# Patient Record
Sex: MALE | Race: WHITE | NOT HISPANIC OR LATINO
[De-identification: names, ages, dates, MRNs, and addresses within clinical notes are randomized per-mention and may not be internally consistent; named-entity substitution may affect disease eponyms.]

---

## 2017-03-08 NOTE — ASU PATIENT PROFILE, ADULT - PMH
Depression    HLD (hyperlipidemia)    HTN (hypertension)    KRISTINA (obstructive sleep apnea)  no cpap  Seasonal affective disorder    Valvular heart disease

## 2017-03-09 ENCOUNTER — TRANSCRIPTION ENCOUNTER (OUTPATIENT)
Age: 67
End: 2017-03-09

## 2017-03-09 ENCOUNTER — OUTPATIENT (OUTPATIENT)
Dept: OUTPATIENT SERVICES | Facility: HOSPITAL | Age: 67
LOS: 1 days | End: 2017-03-09
Payer: COMMERCIAL

## 2017-03-09 VITALS
HEIGHT: 70 IN | DIASTOLIC BLOOD PRESSURE: 73 MMHG | HEART RATE: 61 BPM | RESPIRATION RATE: 17 BRPM | OXYGEN SATURATION: 95 % | TEMPERATURE: 98 F | WEIGHT: 216.49 LBS | SYSTOLIC BLOOD PRESSURE: 131 MMHG

## 2017-03-09 VITALS
DIASTOLIC BLOOD PRESSURE: 79 MMHG | OXYGEN SATURATION: 97 % | HEART RATE: 70 BPM | RESPIRATION RATE: 16 BRPM | SYSTOLIC BLOOD PRESSURE: 132 MMHG

## 2017-03-09 DIAGNOSIS — Z96.659 PRESENCE OF UNSPECIFIED ARTIFICIAL KNEE JOINT: Chronic | ICD-10-CM

## 2017-03-09 DIAGNOSIS — H35.371 PUCKERING OF MACULA, RIGHT EYE: ICD-10-CM

## 2017-03-09 DIAGNOSIS — Z98.890 OTHER SPECIFIED POSTPROCEDURAL STATES: Chronic | ICD-10-CM

## 2017-03-09 PROCEDURE — 67041 VIT FOR MACULAR PUCKER: CPT | Mod: RT

## 2017-03-09 NOTE — ASU DISCHARGE PLAN (ADULT/PEDIATRIC). - SPECIAL INSTRUCTIONS
POSITION HEAD FACE DOWN  FOLLOW UP TOMORROW IN OFFICE  CALL OFFICE -[phone] IF NEEDED FOR EMERGENCY

## 2017-03-09 NOTE — ASU DISCHARGE PLAN (ADULT/PEDIATRIC). - NOTIFY
Swelling that continues/Fever greater than 101/Persistent Nausea and Vomiting/Bleeding that does not stop/Pain not relieved by Medications

## 2017-08-15 ENCOUNTER — EMERGENCY (EMERGENCY)
Facility: HOSPITAL | Age: 67
LOS: 0 days | Discharge: HOME | End: 2017-08-15
Attending: EMERGENCY MEDICINE | Admitting: EMERGENCY MEDICINE
Payer: MEDICARE

## 2017-08-15 VITALS
TEMPERATURE: 99 F | OXYGEN SATURATION: 97 % | RESPIRATION RATE: 18 BRPM | SYSTOLIC BLOOD PRESSURE: 143 MMHG | WEIGHT: 210.1 LBS | HEART RATE: 75 BPM | HEIGHT: 69 IN | DIASTOLIC BLOOD PRESSURE: 80 MMHG

## 2017-08-15 DIAGNOSIS — S80.02XA CONTUSION OF LEFT KNEE, INITIAL ENCOUNTER: ICD-10-CM

## 2017-08-15 DIAGNOSIS — I38 ENDOCARDITIS, VALVE UNSPECIFIED: ICD-10-CM

## 2017-08-15 DIAGNOSIS — I10 ESSENTIAL (PRIMARY) HYPERTENSION: ICD-10-CM

## 2017-08-15 DIAGNOSIS — Z96.651 PRESENCE OF RIGHT ARTIFICIAL KNEE JOINT: ICD-10-CM

## 2017-08-15 DIAGNOSIS — S89.92XA UNSPECIFIED INJURY OF LEFT LOWER LEG, INITIAL ENCOUNTER: ICD-10-CM

## 2017-08-15 DIAGNOSIS — F32.9 MAJOR DEPRESSIVE DISORDER, SINGLE EPISODE, UNSPECIFIED: ICD-10-CM

## 2017-08-15 DIAGNOSIS — Z98.890 OTHER SPECIFIED POSTPROCEDURAL STATES: ICD-10-CM
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
## 2017-08-15 DIAGNOSIS — E78.5 HYPERLIPIDEMIA, UNSPECIFIED: ICD-10-CM

## 2017-08-15 DIAGNOSIS — Z98.890 OTHER SPECIFIED POSTPROCEDURAL STATES: Chronic | ICD-10-CM
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
## 2017-08-15 DIAGNOSIS — Z96.659 PRESENCE OF UNSPECIFIED ARTIFICIAL KNEE JOINT: Chronic | ICD-10-CM

## 2017-08-15 DIAGNOSIS — G47.33 OBSTRUCTIVE SLEEP APNEA (ADULT) (PEDIATRIC): ICD-10-CM

## 2017-08-15 DIAGNOSIS — M25.062 HEMARTHROSIS, LEFT KNEE: ICD-10-CM

## 2017-08-15 PROBLEM — F39 UNSPECIFIED MOOD [AFFECTIVE] DISORDER: Chronic | Status: ACTIVE | Noted: 2017-03-08

## 2017-08-15 PROCEDURE — 73562 X-RAY EXAM OF KNEE 3: CPT | Mod: 26,LT

## 2017-08-15 PROCEDURE — 20605 DRAIN/INJ JOINT/BURSA W/O US: CPT | Mod: LT

## 2017-08-15 PROCEDURE — 99284 EMERGENCY DEPT VISIT MOD MDM: CPT | Mod: 25

## 2017-08-15 NOTE — ED PROVIDER NOTE - OBJECTIVE STATEMENT
pt presents with left knee pain s.p mechanical fall one day prior with effusion pain 7/10 achy worse with movement a/w prior knee replacement no left hip or left ankle pain. no sensory deficits. no other acute issues symptoms or concerns.

## 2017-08-15 NOTE — ED ADULT TRIAGE NOTE - CHIEF COMPLAINT QUOTE
My left knee hurts s/p slip and fall yesterday around 1 p.  Denies hitting head, loc or blood thinners

## 2017-08-15 NOTE — ED PROVIDER NOTE - MUSCULOSKELETAL, MLM
+ ttp left ant knee with decreased rom ext nml felzxion + fluid wave shift no sensory deficits neurovasc intact 2+ dp and pt pulses nrom lle all digitsd

## 2017-08-15 NOTE — ED PROVIDER NOTE - MEDICAL DECISION MAKING DETAILS
rah 35 cc traumatic effusion bloody no purlent or seours fluid pt with great pain relief return to ed for new onset motor or sensory deficits crtuches knee immbolizer provided ortho f.u within one week without fail pt korin boyle he will follow up with already

## 2017-09-08 ENCOUNTER — APPOINTMENT (OUTPATIENT)
Dept: ORTHOPEDIC SURGERY | Facility: CLINIC | Age: 67
End: 2017-09-08

## 2017-10-03 NOTE — ED PROVIDER NOTE - ALLERGIC/IMMUNOLOGIC NEGATIVE STATEMENT, MLM
no dermatitis, no environmental allergies, no food allergies, no immunosuppressive disorder, and no pruritus.
(0) swallows foods and liquids w/o difficulty

## 2017-11-17 RX ORDER — TROPICAMIDE 1 %
1 DROPS OPHTHALMIC (EYE)
Qty: 0 | Refills: 0 | Status: COMPLETED | OUTPATIENT
Start: 2017-11-21 | End: 2017-11-21

## 2017-11-17 RX ORDER — OFLOXACIN 0.3 %
1 DROPS OPHTHALMIC (EYE)
Qty: 0 | Refills: 0 | Status: COMPLETED | OUTPATIENT
Start: 2017-11-21 | End: 2017-11-21

## 2017-11-17 RX ORDER — SODIUM CHLORIDE 9 MG/ML
1000 INJECTION, SOLUTION INTRAVENOUS
Qty: 0 | Refills: 0 | Status: DISCONTINUED | OUTPATIENT
Start: 2017-11-21 | End: 2017-12-06

## 2017-11-17 RX ORDER — PHENYLEPHRINE HCL 2.5 %
1 DROPS OPHTHALMIC (EYE)
Qty: 0 | Refills: 0 | Status: COMPLETED | OUTPATIENT
Start: 2017-11-21 | End: 2017-11-21

## 2017-11-21 ENCOUNTER — OUTPATIENT (OUTPATIENT)
Dept: OUTPATIENT SERVICES | Facility: HOSPITAL | Age: 67
LOS: 1 days | Discharge: HOME | End: 2017-11-21

## 2017-11-21 VITALS
SYSTOLIC BLOOD PRESSURE: 132 MMHG | OXYGEN SATURATION: 95 % | TEMPERATURE: 98 F | HEART RATE: 70 BPM | DIASTOLIC BLOOD PRESSURE: 80 MMHG | RESPIRATION RATE: 15 BRPM

## 2017-11-21 VITALS
WEIGHT: 223.11 LBS | TEMPERATURE: 98 F | DIASTOLIC BLOOD PRESSURE: 85 MMHG | HEART RATE: 71 BPM | SYSTOLIC BLOOD PRESSURE: 138 MMHG | RESPIRATION RATE: 14 BRPM | OXYGEN SATURATION: 95 %

## 2017-11-21 DIAGNOSIS — Z98.890 OTHER SPECIFIED POSTPROCEDURAL STATES: Chronic | ICD-10-CM

## 2017-11-21 DIAGNOSIS — Z96.659 PRESENCE OF UNSPECIFIED ARTIFICIAL KNEE JOINT: Chronic | ICD-10-CM

## 2017-11-21 DIAGNOSIS — Z87.828 PERSONAL HISTORY OF OTHER (HEALED) PHYSICAL INJURY AND TRAUMA: Chronic | ICD-10-CM

## 2017-11-21 RX ORDER — ACETAMINOPHEN 500 MG
650 TABLET ORAL EVERY 6 HOURS
Qty: 0 | Refills: 0 | Status: DISCONTINUED | OUTPATIENT
Start: 2017-11-21 | End: 2017-12-06

## 2017-11-21 RX ORDER — ACETAMINOPHEN 500 MG
2 TABLET ORAL
Qty: 0 | Refills: 0 | DISCHARGE
Start: 2017-11-21

## 2017-11-21 RX ADMIN — Medication 1 DROP(S): at 08:20

## 2017-11-21 RX ADMIN — Medication 1 DROP(S): at 08:32

## 2017-11-21 RX ADMIN — Medication 1 DROP(S): at 08:43

## 2017-11-21 RX ADMIN — Medication 1 DROP(S): at 08:17

## 2017-11-21 NOTE — ASU DISCHARGE PLAN (ADULT/PEDIATRIC). - MEDICATION SUMMARY - MEDICATIONS TO TAKE
I will START or STAY ON the medications listed below when I get home from the hospital:    cozar  -- once a day  -- Indication: For CATARACT RIGHT EYE    welbutrin  -- Indication: For CATARACT RIGHT EYE    aspirin 81 mg oral tablet  -- 1 tab(s) by mouth once a day  -- Indication: For CATARACT RIGHT EYE    acetaminophen 325 mg oral tablet  -- 2 tab(s) by mouth every 6 hours, As needed, Pain  -- Indication: For CATARACT RIGHT EYE    Lipitor  -- orally once a day  -- Indication: For CATARACT RIGHT EYE    Xanax 0.5 mg oral tablet  -- Indication: For CATARACT RIGHT EYE

## 2017-11-21 NOTE — BRIEF OPERATIVE NOTE - PROCEDURE
<<-----Click on this checkbox to enter Procedure Cataract extraction of eye with posterior chamber insertion of intraocular lens  11/21/2017    Active  ESMALL

## 2017-11-21 NOTE — ASU DISCHARGE PLAN (ADULT/PEDIATRIC). - NURSING INSTRUCTIONS
A responsible adult should be with you the first 24 hours after surgery.    Refer to multicolored written discharge instruction sheet.  Call doctor for:   pain that is getting worse or is not controlled with pain medicine.   if you have not urinated  within 8 hours after surgery or have difficulty urinating.   refer to Dr. Núñez's instructions

## 2017-11-21 NOTE — ASU PATIENT PROFILE, ADULT - PSH
History of back injury  sx lumbar 2013 & 2014 with hardware  History of shoulder surgery  right  S/P TKR (total knee replacement)

## 2017-12-05 DIAGNOSIS — H25.11 AGE-RELATED NUCLEAR CATARACT, RIGHT EYE: ICD-10-CM

## 2017-12-05 DIAGNOSIS — Z87.891 PERSONAL HISTORY OF NICOTINE DEPENDENCE: ICD-10-CM

## 2017-12-05 DIAGNOSIS — I10 ESSENTIAL (PRIMARY) HYPERTENSION: ICD-10-CM

## 2017-12-05 DIAGNOSIS — Z96.652 PRESENCE OF LEFT ARTIFICIAL KNEE JOINT: ICD-10-CM

## 2020-09-26 ENCOUNTER — EMERGENCY (EMERGENCY)
Facility: HOSPITAL | Age: 70
LOS: 0 days | Discharge: HOME | End: 2020-09-26
Payer: MEDICARE

## 2020-09-26 VITALS
HEART RATE: 88 BPM | DIASTOLIC BLOOD PRESSURE: 102 MMHG | HEIGHT: 78 IN | SYSTOLIC BLOOD PRESSURE: 151 MMHG | RESPIRATION RATE: 20 BRPM | OXYGEN SATURATION: 96 % | TEMPERATURE: 98 F

## 2020-09-26 DIAGNOSIS — I10 ESSENTIAL (PRIMARY) HYPERTENSION: ICD-10-CM

## 2020-09-26 DIAGNOSIS — Z20.828 CONTACT WITH AND (SUSPECTED) EXPOSURE TO OTHER VIRAL COMMUNICABLE DISEASES: ICD-10-CM

## 2020-09-26 DIAGNOSIS — Z96.659 PRESENCE OF UNSPECIFIED ARTIFICIAL KNEE JOINT: Chronic | ICD-10-CM

## 2020-09-26 DIAGNOSIS — Z87.828 PERSONAL HISTORY OF OTHER (HEALED) PHYSICAL INJURY AND TRAUMA: Chronic | ICD-10-CM

## 2020-09-26 DIAGNOSIS — Z98.890 OTHER SPECIFIED POSTPROCEDURAL STATES: Chronic | ICD-10-CM

## 2020-09-26 DIAGNOSIS — J06.9 ACUTE UPPER RESPIRATORY INFECTION, UNSPECIFIED: ICD-10-CM

## 2020-09-26 PROCEDURE — U0003: CPT

## 2020-09-26 PROCEDURE — 99282 EMERGENCY DEPT VISIT SF MDM: CPT

## 2020-09-26 PROCEDURE — 99283 EMERGENCY DEPT VISIT LOW MDM: CPT

## 2020-09-26 NOTE — ED STATDOCS - PHYSICAL EXAMINATION
Constitutional: NAD AAOx3  Eyes: EOMI, pupils equal  Head: Normocephalic atraumatic  Mouth: no airway obstruction  Cardiac: regular rate   Resp: Lungs CTAB  GI: Abd s/nt/nd  Neuro: CN2-12 intact  Skin: No rashes    Repeat BP is 139/81.

## 2020-09-26 NOTE — ED STATDOCS - PATIENT PORTAL LINK FT
You can access the FollowMyHealth Patient Portal offered by [facility] by registering at the following website: [email or web address]. By joining [facility] FollowMyHealth portal, you will also be able to view your health information using other applications (apps) compatible with our system.

## 2020-09-26 NOTE — ED STATDOCS - NS ED ROS FT
ROS: Constitutional- Neg fever, Neg chills.  Respiratory- (+) cough, Neg SOB  Cardiac- no chest pain, no palpitations, ENT- No rhinorrhea, (+) sore throat, no congestion.  Abdomen- No nausea, no vomiting, no diarrhea.  Urinary- no dysuria, no urgency, no frequency.  Skin- No rashes

## 2020-09-27 LAB — SARS-COV-2 RNA SPEC QL NAA+PROBE: SIGNIFICANT CHANGE UP

## 2020-10-14 ENCOUNTER — INPATIENT (INPATIENT)
Facility: HOSPITAL | Age: 70
LOS: 0 days | Discharge: HOME | DRG: 149 | End: 2020-10-15
Attending: FAMILY MEDICINE | Admitting: INTERNAL MEDICINE
Payer: MEDICARE

## 2020-10-14 VITALS — HEIGHT: 78 IN | WEIGHT: 235.01 LBS

## 2020-10-14 DIAGNOSIS — R42 DIZZINESS AND GIDDINESS: ICD-10-CM

## 2020-10-14 DIAGNOSIS — Z87.828 PERSONAL HISTORY OF OTHER (HEALED) PHYSICAL INJURY AND TRAUMA: Chronic | ICD-10-CM

## 2020-10-14 DIAGNOSIS — Z98.890 OTHER SPECIFIED POSTPROCEDURAL STATES: Chronic | ICD-10-CM

## 2020-10-14 DIAGNOSIS — Z96.659 PRESENCE OF UNSPECIFIED ARTIFICIAL KNEE JOINT: Chronic | ICD-10-CM

## 2020-10-14 LAB
ALBUMIN SERPL ELPH-MCNC: 3.7 G/DL — SIGNIFICANT CHANGE UP (ref 3.3–5)
ALP SERPL-CCNC: 76 U/L — SIGNIFICANT CHANGE UP (ref 40–120)
ALT FLD-CCNC: 38 U/L — SIGNIFICANT CHANGE UP (ref 12–78)
ANION GAP SERPL CALC-SCNC: 5 MMOL/L — SIGNIFICANT CHANGE UP (ref 5–17)
APTT BLD: 31.6 SEC — SIGNIFICANT CHANGE UP (ref 27.5–35.5)
AST SERPL-CCNC: 27 U/L — SIGNIFICANT CHANGE UP (ref 15–37)
BASOPHILS # BLD AUTO: 0.03 K/UL — SIGNIFICANT CHANGE UP (ref 0–0.2)
BASOPHILS NFR BLD AUTO: 0.4 % — SIGNIFICANT CHANGE UP (ref 0–2)
BILIRUB SERPL-MCNC: 0.2 MG/DL — SIGNIFICANT CHANGE UP (ref 0.2–1.2)
BUN SERPL-MCNC: 14 MG/DL — SIGNIFICANT CHANGE UP (ref 7–23)
CALCIUM SERPL-MCNC: 8.7 MG/DL — SIGNIFICANT CHANGE UP (ref 8.5–10.1)
CHLORIDE SERPL-SCNC: 107 MMOL/L — SIGNIFICANT CHANGE UP (ref 96–108)
CO2 SERPL-SCNC: 24 MMOL/L — SIGNIFICANT CHANGE UP (ref 22–31)
CREAT SERPL-MCNC: 0.93 MG/DL — SIGNIFICANT CHANGE UP (ref 0.5–1.3)
EOSINOPHIL # BLD AUTO: 0.09 K/UL — SIGNIFICANT CHANGE UP (ref 0–0.5)
EOSINOPHIL NFR BLD AUTO: 1.3 % — SIGNIFICANT CHANGE UP (ref 0–6)
GLUCOSE SERPL-MCNC: 97 MG/DL — SIGNIFICANT CHANGE UP (ref 70–99)
HCT VFR BLD CALC: 44.7 % — SIGNIFICANT CHANGE UP (ref 39–50)
HGB BLD-MCNC: 15 G/DL — SIGNIFICANT CHANGE UP (ref 13–17)
IMM GRANULOCYTES NFR BLD AUTO: 0.6 % — SIGNIFICANT CHANGE UP (ref 0–1.5)
INR BLD: 0.95 RATIO — SIGNIFICANT CHANGE UP (ref 0.88–1.16)
LYMPHOCYTES # BLD AUTO: 1.56 K/UL — SIGNIFICANT CHANGE UP (ref 1–3.3)
LYMPHOCYTES # BLD AUTO: 23.4 % — SIGNIFICANT CHANGE UP (ref 13–44)
MAGNESIUM SERPL-MCNC: 2.1 MG/DL — SIGNIFICANT CHANGE UP (ref 1.6–2.6)
MCHC RBC-ENTMCNC: 31.8 PG — SIGNIFICANT CHANGE UP (ref 27–34)
MCHC RBC-ENTMCNC: 33.6 GM/DL — SIGNIFICANT CHANGE UP (ref 32–36)
MCV RBC AUTO: 94.9 FL — SIGNIFICANT CHANGE UP (ref 80–100)
MONOCYTES # BLD AUTO: 0.8 K/UL — SIGNIFICANT CHANGE UP (ref 0–0.9)
MONOCYTES NFR BLD AUTO: 12 % — SIGNIFICANT CHANGE UP (ref 2–14)
NEUTROPHILS # BLD AUTO: 4.15 K/UL — SIGNIFICANT CHANGE UP (ref 1.8–7.4)
NEUTROPHILS NFR BLD AUTO: 62.3 % — SIGNIFICANT CHANGE UP (ref 43–77)
PLATELET # BLD AUTO: 216 K/UL — SIGNIFICANT CHANGE UP (ref 150–400)
POTASSIUM SERPL-MCNC: 4.2 MMOL/L — SIGNIFICANT CHANGE UP (ref 3.5–5.3)
POTASSIUM SERPL-SCNC: 4.2 MMOL/L — SIGNIFICANT CHANGE UP (ref 3.5–5.3)
PROT SERPL-MCNC: 7.2 GM/DL — SIGNIFICANT CHANGE UP (ref 6–8.3)
PROTHROM AB SERPL-ACNC: 11.1 SEC — SIGNIFICANT CHANGE UP (ref 10.6–13.6)
RBC # BLD: 4.71 M/UL — SIGNIFICANT CHANGE UP (ref 4.2–5.8)
RBC # FLD: 12.3 % — SIGNIFICANT CHANGE UP (ref 10.3–14.5)
SARS-COV-2 RNA SPEC QL NAA+PROBE: SIGNIFICANT CHANGE UP
SODIUM SERPL-SCNC: 136 MMOL/L — SIGNIFICANT CHANGE UP (ref 135–145)
TROPONIN I SERPL-MCNC: <0.015 NG/ML — SIGNIFICANT CHANGE UP (ref 0.01–0.04)
TROPONIN I SERPL-MCNC: <0.015 NG/ML — SIGNIFICANT CHANGE UP (ref 0.01–0.04)
WBC # BLD: 6.67 K/UL — SIGNIFICANT CHANGE UP (ref 3.8–10.5)
WBC # FLD AUTO: 6.67 K/UL — SIGNIFICANT CHANGE UP (ref 3.8–10.5)

## 2020-10-14 PROCEDURE — 99233 SBSQ HOSP IP/OBS HIGH 50: CPT

## 2020-10-14 PROCEDURE — 93010 ELECTROCARDIOGRAM REPORT: CPT

## 2020-10-14 PROCEDURE — 84484 ASSAY OF TROPONIN QUANT: CPT

## 2020-10-14 PROCEDURE — 72141 MRI NECK SPINE W/O DYE: CPT

## 2020-10-14 PROCEDURE — 36415 COLL VENOUS BLD VENIPUNCTURE: CPT

## 2020-10-14 PROCEDURE — 70551 MRI BRAIN STEM W/O DYE: CPT

## 2020-10-14 PROCEDURE — G0008: CPT

## 2020-10-14 PROCEDURE — 86803 HEPATITIS C AB TEST: CPT

## 2020-10-14 PROCEDURE — 86769 SARS-COV-2 COVID-19 ANTIBODY: CPT

## 2020-10-14 PROCEDURE — 70551 MRI BRAIN STEM W/O DYE: CPT | Mod: 26

## 2020-10-14 PROCEDURE — 90686 IIV4 VACC NO PRSV 0.5 ML IM: CPT

## 2020-10-14 PROCEDURE — 72141 MRI NECK SPINE W/O DYE: CPT | Mod: 26

## 2020-10-14 PROCEDURE — 70496 CT ANGIOGRAPHY HEAD: CPT | Mod: 26

## 2020-10-14 PROCEDURE — 85025 COMPLETE CBC W/AUTO DIFF WBC: CPT

## 2020-10-14 PROCEDURE — U0003: CPT

## 2020-10-14 PROCEDURE — 70498 CT ANGIOGRAPHY NECK: CPT | Mod: 26

## 2020-10-14 PROCEDURE — 99223 1ST HOSP IP/OBS HIGH 75: CPT | Mod: GC

## 2020-10-14 PROCEDURE — 80053 COMPREHEN METABOLIC PANEL: CPT

## 2020-10-14 PROCEDURE — 71045 X-RAY EXAM CHEST 1 VIEW: CPT | Mod: 26

## 2020-10-14 RX ORDER — ALPRAZOLAM 0.25 MG
0 TABLET ORAL
Qty: 0 | Refills: 0 | DISCHARGE

## 2020-10-14 RX ORDER — LOSARTAN POTASSIUM 100 MG/1
50 TABLET, FILM COATED ORAL DAILY
Refills: 0 | Status: DISCONTINUED | OUTPATIENT
Start: 2020-10-14 | End: 2020-10-15

## 2020-10-14 RX ORDER — ASPIRIN/CALCIUM CARB/MAGNESIUM 324 MG
325 TABLET ORAL ONCE
Refills: 0 | Status: COMPLETED | OUTPATIENT
Start: 2020-10-14 | End: 2020-10-14

## 2020-10-14 RX ORDER — BUPROPION HYDROCHLORIDE 150 MG/1
150 TABLET, EXTENDED RELEASE ORAL DAILY
Refills: 0 | Status: DISCONTINUED | OUTPATIENT
Start: 2020-10-14 | End: 2020-10-15

## 2020-10-14 RX ORDER — ALPRAZOLAM 0.25 MG
1 TABLET ORAL ONCE
Refills: 0 | Status: DISCONTINUED | OUTPATIENT
Start: 2020-10-14 | End: 2020-10-14

## 2020-10-14 RX ORDER — SODIUM CHLORIDE 9 MG/ML
1000 INJECTION INTRAMUSCULAR; INTRAVENOUS; SUBCUTANEOUS ONCE
Refills: 0 | Status: COMPLETED | OUTPATIENT
Start: 2020-10-14 | End: 2020-10-14

## 2020-10-14 RX ORDER — IBUPROFEN 200 MG
200 TABLET ORAL DAILY
Refills: 0 | Status: DISCONTINUED | OUTPATIENT
Start: 2020-10-14 | End: 2020-10-15

## 2020-10-14 RX ORDER — ONDANSETRON 8 MG/1
4 TABLET, FILM COATED ORAL EVERY 6 HOURS
Refills: 0 | Status: DISCONTINUED | OUTPATIENT
Start: 2020-10-14 | End: 2020-10-15

## 2020-10-14 RX ORDER — ATORVASTATIN CALCIUM 80 MG/1
0 TABLET, FILM COATED ORAL
Qty: 0 | Refills: 0 | DISCHARGE

## 2020-10-14 RX ORDER — HYDROCHLOROTHIAZIDE 25 MG
12.5 TABLET ORAL DAILY
Refills: 0 | Status: DISCONTINUED | OUTPATIENT
Start: 2020-10-14 | End: 2020-10-15

## 2020-10-14 RX ORDER — ATORVASTATIN CALCIUM 80 MG/1
20 TABLET, FILM COATED ORAL AT BEDTIME
Refills: 0 | Status: DISCONTINUED | OUTPATIENT
Start: 2020-10-14 | End: 2020-10-15

## 2020-10-14 RX ORDER — ATORVASTATIN CALCIUM 80 MG/1
1 TABLET, FILM COATED ORAL
Qty: 0 | Refills: 0 | DISCHARGE

## 2020-10-14 RX ORDER — HYDROMORPHONE HYDROCHLORIDE 2 MG/ML
4 INJECTION INTRAMUSCULAR; INTRAVENOUS; SUBCUTANEOUS DAILY
Refills: 0 | Status: DISCONTINUED | OUTPATIENT
Start: 2020-10-14 | End: 2020-10-15

## 2020-10-14 RX ORDER — ALPRAZOLAM 0.25 MG
0.25 TABLET ORAL DAILY
Refills: 0 | Status: DISCONTINUED | OUTPATIENT
Start: 2020-10-14 | End: 2020-10-15

## 2020-10-14 RX ADMIN — Medication 325 MILLIGRAM(S): at 19:27

## 2020-10-14 RX ADMIN — SODIUM CHLORIDE 1000 MILLILITER(S): 9 INJECTION INTRAMUSCULAR; INTRAVENOUS; SUBCUTANEOUS at 16:37

## 2020-10-14 RX ADMIN — Medication 1 MILLIGRAM(S): at 20:31

## 2020-10-14 NOTE — H&P ADULT - NSHPPHYSICALEXAM_GEN_ALL_CORE
Vital Signs Last 24 Hrs  T(C): 36.3 (14 Oct 2020 19:10), Max: 36.4 (14 Oct 2020 15:43)  T(F): 97.4 (14 Oct 2020 19:10), Max: 97.6 (14 Oct 2020 15:43)  HR: 72 (14 Oct 2020 19:10) (72 - 89)  BP: 123/79 (14 Oct 2020 19:10) (123/79 - 137/92)  BP(mean): 105 (14 Oct 2020 15:43) (105 - 105)  RR: 18 (14 Oct 2020 19:10) (18 - 18)  SpO2: 100% (14 Oct 2020 19:10) (96% - 100%)

## 2020-10-14 NOTE — H&P ADULT - ASSESSMENT
70M w/ hx of HTN, HLD, KRISTINA, and seasonal affective disorder who presents with dizziness 2/2 BPPV, stroke  Dizziness with ataxia   -positional likely bppv  -CTangio of head and neck:  1. Involutional changes in both hemispheres without an acute infarct or hemorrhage. No hydrocephalus or midline shift. No space-occupying lesion noted. Follow-up MR imaging of the brain may be considered for further assessment.  2. Calcified and noncalcified plaque both carotid bifurcations with at least moderate narrowing of both proximal internal carotid arteries. This may be further assessed with Doppler.  3. Both internal carotid arteries are markedly tortuous but widely patent. Also the vertebral arteries are bilaterally patent.  4. Tortuous but widely patent intracerebral vasculature also noted.  5. Extensive degenerative changes of the cervical spine with multilevel stenosis. Cord encroachment is suggested from C4 to C7.This may be further assessed with MR imaging as well.    -Plaques on imaging is not new and has been present for 2 years as per the patient  -admit to tele  -Neuro consult  -MRI head    HTN  -stable  DASH/TLC diet  -continue home medications of losartan 50mg qd hold for sbp<100  -continue hctz 12.5mg qd    HLD  -continue atorvastatin 20mg qhs  -restart rosuvastatin 5mg qhs upon discharge    Seasonal affective disorder  -continue home medication of welbutrin 150mg qd    Prophylactic measures  IMPROVE VTE Individual Risk Assessment      IMPROVE VTE Score ___1______    scds   70M w/ hx of HTN, HLD, KRISTINA, and seasonal affective disorder who presents with dizziness 2/2 BPPV, stroke  Dizziness with ataxia   -positional, bppv, cervical vertigo due to cord compression  -CTangio of head and neck:  1. Involutional changes in both hemispheres without an acute infarct or hemorrhage. No hydrocephalus or midline shift. No space-occupying lesion noted. Follow-up MR imaging of the brain may be considered for further assessment.  2. Calcified and noncalcified plaque both carotid bifurcations with at least moderate narrowing of both proximal internal carotid arteries. This may be further assessed with Doppler.  3. Both internal carotid arteries are markedly tortuous but widely patent. Also the vertebral arteries are bilaterally patent.  4. Tortuous but widely patent intracerebral vasculature also noted.  5. Extensive degenerative changes of the cervical spine with multilevel stenosis. Cord encroachment is suggested from C4 to C7.This may be further assessed with MR imaging as well.    -Plaques on imaging is not new and has been present for 2 years as per the patient  -admit to tele  -Neuro consult  -MRI head    HTN  -stable  DASH/TLC diet  -continue home medications of losartan 50mg qd hold for sbp<100  -continue hctz 12.5mg qd    HLD  -continue atorvastatin 20mg qhs  -restart rosuvastatin 5mg qhs upon discharge    Seasonal affective disorder  -continue home medication of welbutrin 150mg qd    Prophylactic measures  IMPROVE VTE Individual Risk Assessment      IMPROVE VTE Score ___1______    scds

## 2020-10-14 NOTE — H&P ADULT - ATTENDING COMMENTS
71 y/o M PMHx as noted above who presents to  for further evaluation and management of intractable vertigo with associate ataxic gait disturbance.    #Acute Onset of Intractable Vertigo with Ataxia   ~admit to Telemetry  ~DDx includes CVA, Cervicogenic Vertigo due to Cervical Cord Compression, Positional BPPV  ~Neurosurgery was consulted in the ED  ~initial CTA Head/Neck revealed signs of extensive cervical spine multilevel stenosis concerning for cord encroachment/compression  ~MR Head and C-spine were ordered appropriately   ~case discussed with Neurosurgery  ~f/u w/ Neurology in the am    #Hypertension  ~as above cont. home medications as ordered     #Hyperlipidemia  ~cont. statin therapy w/ Atorvastatin 20mg po qhs    #Seasonal affective disorder  ~cont. Wellbutrin XL 150mg po qd    #Vte ppx  ~IMPROVE Vte Risk Score is 1  ~cont. SCDs for now

## 2020-10-14 NOTE — ED STATDOCS - CLINICAL SUMMARY MEDICAL DECISION MAKING FREE TEXT BOX
71 y/o male with dizziness and unsteady gait. Plan: EKG, chest XR, labs, obtain outpatient CT head, reeval.

## 2020-10-14 NOTE — ED STATDOCS - PROGRESS NOTE DETAILS
I Beatrice Lockhart attest that this documentation has been prepared under the direction and in the presence of Doctor Parker. Patient initially seen and evaluated with ED attending at intake.  +dizziness with ataxic gait.  Was unable to obtain outpatient CT as it was not read by radiologist yes.  CTA here with chronic changes, no acute explanation.  As patient is persistently dizzy and ataxic, recommended admission for further evaluation and MRI.  Patient agreeable -Geoffrey Partida PA-C Elena LUCAS: endorsed to Dr. Mccollum for admission.

## 2020-10-14 NOTE — H&P ADULT - HISTORY OF PRESENT ILLNESS
70M w/ hx of HTN, HLD, KRISTINA, and seasonal affective disorder who presents with dizziness since Saturday. The pt bent down while playing golf and when he stood up he felt dizzy and blurry vision. States he felt off balance, not that the room was spinning. It lasted for a couple of minutes and resolved on its own. The patient did take dilaudid prior to playing golf. The same symptoms occurred again on Tuesday prior to admission while bending over as well as on day of admission. + associated nausea. Denies vomiting, numbness, tingling, slurring of speech, weakness, fever, chills, cough, SOB, CP, HA, or urinary symptoms.

## 2020-10-14 NOTE — H&P ADULT - RS GEN PE MLT RESP DETAILS PC
breath sounds equal/respirations non-labored/no rales/no rhonchi/no wheezes/clear to auscultation bilaterally/airway patent/good air movement

## 2020-10-14 NOTE — ED STATDOCS - ATTENDING CONTRIBUTION TO CARE
I, Amaris Parker DO,  performed the initial face to face bedside interview with this patient regarding history of present illness, review of symptoms and relevant past medical, social and family history.  I completed an independent physical examination.  I was the initial provider who evaluated this patient. I have signed out the follow up of any pending tests (i.e. labs, radiological studies) to the ACP.  I have communicated the patient’s plan of care and disposition with the ACP.  The history, relevant review of systems, past medical and surgical history, medical decision making, and physical examination was documented by the scribe in my presence and I attest to the accuracy of the documentation.

## 2020-10-14 NOTE — ED STATDOCS - OBJECTIVE STATEMENT
69 y/o male with a PMHx of depression, HTN, HLD, KRISTINA, seasonal affective disorder, valvular heart disease, presents to the ED c/o intermittent episodes of dizziness and blurry vision x5 days. Pt reports first episode began after he bent down to  a golf ball. Pt saw Dr. Sinha yesterday and was sent for imaging. Pt had CT this morning. Pt called Dr. Sinha today as symptoms persisted and was sent to ED for further evaluation. Pt also c/o unsteady gait. Denies weakness/numbness of arms and legs, changes in speech. No other complaints at this time.

## 2020-10-14 NOTE — H&P ADULT - NSICDXPASTSURGICALHX_GEN_ALL_CORE_FT
PAST SURGICAL HISTORY:  History of back injury sx lumbar 2013 & 2014 with hardware    History of shoulder surgery right    S/P TKR (total knee replacement) left

## 2020-10-14 NOTE — ED ADULT NURSE NOTE - OBJECTIVE STATEMENT
pt is 69 yo male presents to ED for dizziness and blurry vision after playing golf 1 week ago.  Denies falls, or trauma, not on any blood thinners

## 2020-10-14 NOTE — H&P ADULT - NEUROLOGICAL DETAILS
no spontaneous movement/responds to verbal commands/sensation intact/alert and oriented x 3/responds to pain/cranial nerves intact/normal strength

## 2020-10-14 NOTE — ED ADULT NURSE REASSESSMENT NOTE - NS ED NURSE REASSESS COMMENT FT1
Pt requesting xanax for his anxiety. Pt states 0.75mg or 1mg. AN Partida notified of pt request.
Pt resting comfortably in stretcher. Pt denies needs. Bed low wheels locked siderails up x2. Call bell in right hand.
Pt resting comfortably in stretcher. Pt denies needs. Call bell within reach of right hand. Bed low and locked.
Patient received at this time from RN Burt.  VS stable as charted.  Awaiting dinner tray and room assignment.

## 2020-10-14 NOTE — H&P ADULT - NSICDXPASTMEDICALHX_GEN_ALL_CORE_FT
PAST MEDICAL HISTORY:  Depression     HLD (hyperlipidemia)     HTN (hypertension)     KRISTINA (obstructive sleep apnea) no cpap    Seasonal affective disorder

## 2020-10-15 ENCOUNTER — TRANSCRIPTION ENCOUNTER (OUTPATIENT)
Age: 70
End: 2020-10-15

## 2020-10-15 VITALS
SYSTOLIC BLOOD PRESSURE: 141 MMHG | HEART RATE: 71 BPM | TEMPERATURE: 98 F | RESPIRATION RATE: 18 BRPM | OXYGEN SATURATION: 97 % | DIASTOLIC BLOOD PRESSURE: 80 MMHG

## 2020-10-15 LAB
ALBUMIN SERPL ELPH-MCNC: 3.4 G/DL — SIGNIFICANT CHANGE UP (ref 3.3–5)
ALP SERPL-CCNC: 66 U/L — SIGNIFICANT CHANGE UP (ref 40–120)
ALT FLD-CCNC: 34 U/L — SIGNIFICANT CHANGE UP (ref 12–78)
ANION GAP SERPL CALC-SCNC: 4 MMOL/L — LOW (ref 5–17)
AST SERPL-CCNC: 19 U/L — SIGNIFICANT CHANGE UP (ref 15–37)
BASOPHILS # BLD AUTO: 0.02 K/UL — SIGNIFICANT CHANGE UP (ref 0–0.2)
BASOPHILS NFR BLD AUTO: 0.4 % — SIGNIFICANT CHANGE UP (ref 0–2)
BILIRUB SERPL-MCNC: 0.3 MG/DL — SIGNIFICANT CHANGE UP (ref 0.2–1.2)
BUN SERPL-MCNC: 12 MG/DL — SIGNIFICANT CHANGE UP (ref 7–23)
CALCIUM SERPL-MCNC: 9.1 MG/DL — SIGNIFICANT CHANGE UP (ref 8.5–10.1)
CHLORIDE SERPL-SCNC: 110 MMOL/L — HIGH (ref 96–108)
CO2 SERPL-SCNC: 26 MMOL/L — SIGNIFICANT CHANGE UP (ref 22–31)
CREAT SERPL-MCNC: 0.76 MG/DL — SIGNIFICANT CHANGE UP (ref 0.5–1.3)
EOSINOPHIL # BLD AUTO: 0.11 K/UL — SIGNIFICANT CHANGE UP (ref 0–0.5)
EOSINOPHIL NFR BLD AUTO: 2.2 % — SIGNIFICANT CHANGE UP (ref 0–6)
GLUCOSE SERPL-MCNC: 92 MG/DL — SIGNIFICANT CHANGE UP (ref 70–99)
HCT VFR BLD CALC: 41.6 % — SIGNIFICANT CHANGE UP (ref 39–50)
HCV AB S/CO SERPL IA: 0.08 S/CO — SIGNIFICANT CHANGE UP (ref 0–0.99)
HCV AB SERPL-IMP: SIGNIFICANT CHANGE UP
HGB BLD-MCNC: 14 G/DL — SIGNIFICANT CHANGE UP (ref 13–17)
IMM GRANULOCYTES NFR BLD AUTO: 0.6 % — SIGNIFICANT CHANGE UP (ref 0–1.5)
LYMPHOCYTES # BLD AUTO: 1.2 K/UL — SIGNIFICANT CHANGE UP (ref 1–3.3)
LYMPHOCYTES # BLD AUTO: 24 % — SIGNIFICANT CHANGE UP (ref 13–44)
MCHC RBC-ENTMCNC: 32.3 PG — SIGNIFICANT CHANGE UP (ref 27–34)
MCHC RBC-ENTMCNC: 33.7 GM/DL — SIGNIFICANT CHANGE UP (ref 32–36)
MCV RBC AUTO: 96.1 FL — SIGNIFICANT CHANGE UP (ref 80–100)
MONOCYTES # BLD AUTO: 0.61 K/UL — SIGNIFICANT CHANGE UP (ref 0–0.9)
MONOCYTES NFR BLD AUTO: 12.2 % — SIGNIFICANT CHANGE UP (ref 2–14)
NEUTROPHILS # BLD AUTO: 3.03 K/UL — SIGNIFICANT CHANGE UP (ref 1.8–7.4)
NEUTROPHILS NFR BLD AUTO: 60.6 % — SIGNIFICANT CHANGE UP (ref 43–77)
PLATELET # BLD AUTO: 171 K/UL — SIGNIFICANT CHANGE UP (ref 150–400)
POTASSIUM SERPL-MCNC: 3.9 MMOL/L — SIGNIFICANT CHANGE UP (ref 3.5–5.3)
POTASSIUM SERPL-SCNC: 3.9 MMOL/L — SIGNIFICANT CHANGE UP (ref 3.5–5.3)
PROT SERPL-MCNC: 6.6 GM/DL — SIGNIFICANT CHANGE UP (ref 6–8.3)
RBC # BLD: 4.33 M/UL — SIGNIFICANT CHANGE UP (ref 4.2–5.8)
RBC # FLD: 12.6 % — SIGNIFICANT CHANGE UP (ref 10.3–14.5)
SODIUM SERPL-SCNC: 140 MMOL/L — SIGNIFICANT CHANGE UP (ref 135–145)
TROPONIN I SERPL-MCNC: <0.015 NG/ML — SIGNIFICANT CHANGE UP (ref 0.01–0.04)
WBC # BLD: 5 K/UL — SIGNIFICANT CHANGE UP (ref 3.8–10.5)
WBC # FLD AUTO: 5 K/UL — SIGNIFICANT CHANGE UP (ref 3.8–10.5)

## 2020-10-15 PROCEDURE — 99239 HOSP IP/OBS DSCHRG MGMT >30: CPT

## 2020-10-15 PROCEDURE — 99223 1ST HOSP IP/OBS HIGH 75: CPT

## 2020-10-15 RX ORDER — INFLUENZA VIRUS VACCINE 15; 15; 15; 15 UG/.5ML; UG/.5ML; UG/.5ML; UG/.5ML
0.5 SUSPENSION INTRAMUSCULAR ONCE
Refills: 0 | Status: COMPLETED | OUTPATIENT
Start: 2020-10-15 | End: 2020-10-15

## 2020-10-15 RX ORDER — ASPIRIN/CALCIUM CARB/MAGNESIUM 324 MG
81 TABLET ORAL DAILY
Refills: 0 | Status: DISCONTINUED | OUTPATIENT
Start: 2020-10-15 | End: 2020-10-15

## 2020-10-15 RX ORDER — MECLIZINE HCL 12.5 MG
25 TABLET ORAL
Qty: 30 | Refills: 0
Start: 2020-10-15 | End: 2020-11-13

## 2020-10-15 RX ADMIN — INFLUENZA VIRUS VACCINE 0.5 MILLILITER(S): 15; 15; 15; 15 SUSPENSION INTRAMUSCULAR at 15:38

## 2020-10-15 RX ADMIN — LOSARTAN POTASSIUM 50 MILLIGRAM(S): 100 TABLET, FILM COATED ORAL at 09:43

## 2020-10-15 RX ADMIN — Medication 12.5 MILLIGRAM(S): at 09:42

## 2020-10-15 RX ADMIN — Medication 81 MILLIGRAM(S): at 09:42

## 2020-10-15 RX ADMIN — BUPROPION HYDROCHLORIDE 150 MILLIGRAM(S): 150 TABLET, EXTENDED RELEASE ORAL at 09:42

## 2020-10-15 NOTE — CONSULT NOTE ADULT - SUBJECTIVE AND OBJECTIVE BOX
Patient is a 70y old  Male who presents with a chief complaint of Intractable Dizziness (15 Oct 2020 01:58)      HPI:  70M w/ hx of HTN, HLD, KRISTINA, and seasonal affective disorder who presents with dizziness since Saturday. The pt bent down while playing golf and when he stood up he felt dizzy and blurry vision. States he felt off balance, not that the room was spinning. It lasted for a couple of minutes and resolved on its own. The patient did take dilaudid prior to playing golf. The same symptoms occurred again on Tuesday prior to admission while bending over as well as on day of admission. + associated nausea. Denies vomiting, numbness, tingling, slurring of speech, weakness, fever, chills, cough, SOB, CP, HA, or urinary symptoms. (14 Oct 2020 21:33)    I spoke to his wife who reports that his gait has been "sloppy" for over one month.  She says that yesterday he could not walk a straight line even when he was not dizzy.   A few days ago he had fallen outside and was on his back.  She reports that he has had a few falls over the last year.    She also reports that he has severe KRISTINA and does not use a CPAP.  She feels that it is worse recently in the setting of weight gain.    PAST MEDICAL & SURGICAL HISTORY:  Seasonal affective disorder  Depression  KRISTINA (obstructive sleep apnea)  no cpap  HLD (hyperlipidemia)  HTN (hypertension)  History of back injury  sx lumbar 2013 &amp; 2014 with hardware  History of shoulder surgery  right  S/P TKR (total knee replacement)  left        FAMILY HISTORY:  No pertinent family history in first degree relatives        Social Hx:  Nonsmoker, no drug or alcohol use    MEDICATIONS  (STANDING):  aspirin  chewable 81 milliGRAM(s) Oral daily  atorvastatin 20 milliGRAM(s) Oral at bedtime  buPROPion XL . 150 milliGRAM(s) Oral daily  hydrochlorothiazide 12.5 milliGRAM(s) Oral daily  influenza   Vaccine 0.5 milliLiter(s) IntraMuscular once  losartan 50 milliGRAM(s) Oral daily       Allergies    No Known Allergies    Intolerances        ROS: Pertinent positives in HPI, all other ROS were reviewed and are negative.      Vital Signs Last 24 Hrs  T(C): 36.4 (15 Oct 2020 09:37), Max: 36.6 (14 Oct 2020 23:38)  T(F): 97.6 (15 Oct 2020 09:37), Max: 97.9 (14 Oct 2020 23:38)  HR: 71 (15 Oct 2020 09:37) (64 - 89)  BP: 141/80 (15 Oct 2020 09:37) (123/79 - 141/80)  BP(mean): 105 (14 Oct 2020 15:43) (105 - 105)  RR: 18 (15 Oct 2020 09:37) (18 - 18)  SpO2: 97% (15 Oct 2020 09:37) (96% - 100%)        Constitutional: awake and alert.  HEENT: PERRLA, EOMI,   Neck: Supple.  Respiratory: Breath sounds are clear bilaterally  Cardiovascular: S1 and S2, regular / irregular rhythm  Gastrointestinal: soft, nontender  Extremities:  no edema  Vascular: Caritid Bruit - no  Musculoskeletal: no joint swelling/tenderness, no abnormal movements  Skin: No rashes    Neurological exam:  HF: A x O x 3. Appropriately interactive, normal affect. Speech fluent, No Aphasia or paraphasic errors. Naming /repetition intact   CN: RASHEED, EOMI, VFF, facial sensation normal, no NLFD, tongue midline, Palate moves equally, SCM equal bilaterally  Motor: No pronator drift, Strength 5/5 in all 4 ext, normal bulk and tone, no tremor, rigidity or bradykinesia.    Sens: Intact to light touch / PP/ VS/ JS    Reflexes: Symmetric and normal . BJ 2+, BR 2+, KJ 2+, AJ 2+, downgoing toes b/l  Coord:  No FNFA, dysmetria, JAVIER intact   Gait/Balance: Normal/Cannot test    NIHSS:          Labs:   10-15    140  |  110<H>  |  12  ----------------------------<  92  3.9   |  26  |  0.76    Ca    9.1      15 Oct 2020 08:46  Mg     2.1     10-14    TPro  6.6  /  Alb  3.4  /  TBili  0.3  /  DBili  x   /  AST  19  /  ALT  34  /  AlkPhos  66  10-15                              14.0   5.00  )-----------( 171      ( 15 Oct 2020 08:46 )             41.6       Radiology:  - CT Head:  - MRI brain  -MRA brain/Carotids  - EEG    A/P:    No IV tpa given because…    -ASA/PLAVIX  -Atorvastatin  -DVT prophylaxis  -Dysphagia screen  -Speech and swallow eval  -PT eval/ rehab eval    Mangement d/w Pt / family /     Total Critical Care Time spent:   Patient is a 70y old  Male who presents with a chief complaint of Intractable Dizziness (15 Oct 2020 01:58)      HPI:  70M w/ hx of HTN, HLD, KRISTINA, and seasonal affective disorder who presents with dizziness since Saturday. The pt bent down while playing golf and when he stood up he felt dizzy and blurry vision. States he felt off balance, not that the room was spinning. It lasted for a couple of minutes and resolved on its own. The patient did take dilaudid prior to playing golf. The same symptoms occurred again on Tuesday prior to admission while bending over as well as on day of admission. + associated nausea. Denies vomiting, numbness, tingling, slurring of speech, weakness, fever, chills, cough, SOB, CP, HA, or urinary symptoms. (14 Oct 2020 21:33)    I spoke to his wife who reports that his gait has been "sloppy" for over one month.  She says that yesterday he could not walk a straight line even when he was not dizzy.   A few days ago he had fallen outside and was on his back.  She reports that he has had a few falls over the last year.    She also reports that he has severe KRISTINA and does not use a CPAP.  She feels that it is worse recently in the setting of weight gain.    He does report occasionally having numbness in left hand at night.    PAST MEDICAL & SURGICAL HISTORY:  Seasonal affective disorder  Depression  KRISTINA (obstructive sleep apnea)  no cpap  HLD (hyperlipidemia)  HTN (hypertension)  History of back injury  sx lumbar 2013 &amp; 2014 with hardware  History of shoulder surgery  right  S/P TKR (total knee replacement)  left        FAMILY HISTORY:  No pertinent family history in first degree relatives        Social Hx:  Nonsmoker, no drug or alcohol use    MEDICATIONS  (STANDING):  aspirin  chewable 81 milliGRAM(s) Oral daily  atorvastatin 20 milliGRAM(s) Oral at bedtime  buPROPion XL . 150 milliGRAM(s) Oral daily  hydrochlorothiazide 12.5 milliGRAM(s) Oral daily  influenza   Vaccine 0.5 milliLiter(s) IntraMuscular once  losartan 50 milliGRAM(s) Oral daily       Allergies    No Known Allergies    Intolerances        ROS: Pertinent positives in HPI, all other ROS were reviewed and are negative.      Vital Signs Last 24 Hrs  T(C): 36.4 (15 Oct 2020 09:37), Max: 36.6 (14 Oct 2020 23:38)  T(F): 97.6 (15 Oct 2020 09:37), Max: 97.9 (14 Oct 2020 23:38)  HR: 71 (15 Oct 2020 09:37) (64 - 89)  BP: 141/80 (15 Oct 2020 09:37) (123/79 - 141/80)  BP(mean): 105 (14 Oct 2020 15:43) (105 - 105)  RR: 18 (15 Oct 2020 09:37) (18 - 18)  SpO2: 97% (15 Oct 2020 09:37) (96% - 100%)        Constitutional: awake and alert.  HEENT: PERRLA, EOMI,   Neck: Supple.  Respiratory: Breath sounds are clear bilaterally  Cardiovascular: S1 and S2, regular / irregular rhythm  Gastrointestinal: soft, nontender  Extremities:  no edema  Vascular: Carotid Bruit - no  Musculoskeletal: no joint swelling/tenderness, no abnormal movements  Skin: No rashes    Neurological exam:  HF: A x O x 3. Appropriately interactive, normal affect. Speech fluent, No Aphasia or paraphasic errors. Naming /repetition intact   CN: RASHEED, EOMI, VFF, facial sensation normal, no NLFD, tongue midline, Palate moves equally, SCM equal bilaterally  Motor: No pronator drift, Strength 5/5 in all 4 ext, normal bulk and tone, no tremor, rigidity or bradykinesia.    Sens: Intact to light touch   Reflexes: Symmetric and hypoactive throughout - 1 in b/l biceps, radialis, trace in triceps, patellars, absent ankle jerks, negative Rutledge's  Coord:  No FNFA, dysmetria, JAVIER intact   Gait/Balance: slightly wide based, steady        Labs:   10-15    140  |  110<H>  |  12  ----------------------------<  92  3.9   |  26  |  0.76    Ca    9.1      15 Oct 2020 08:46  Mg     2.1     10-14    TPro  6.6  /  Alb  3.4  /  TBili  0.3  /  DBili  x   /  AST  19  /  ALT  34  /  AlkPhos  66  10-15                              14.0   5.00  )-----------( 171      ( 15 Oct 2020 08:46 )             41.6       Radiology:  CT head/CTA head/CTA neck 10/14/20:    1. Involutional changes in both hemispheres without an acute infarct or hemorrhage. No hydrocephalus or midline shift. No space-occupying lesion noted. Follow-up MR imaging of the brain may be considered for further assessment.  2. Calcified and noncalcified plaque both carotid bifurcations with at least moderate narrowing of both proximal internal carotid arteries. This may be further assessed with Doppler.  3. Both internal carotid arteries are markedly tortuous but widely patent. Also the vertebral arteries are bilaterally patent.  4. Tortuous but widely patent intracerebral vasculature also noted.  5. Extensive degenerative changes of the cervical spine with multilevel stenosis. Cord encroachment is suggested from C4 to C7.This may be further assessed with MR imaging as well.    MRI brain 10/14/20:  No acute intracranial abnormality.    MRI cervical spine 10/14/20:  1. Reversal of the normal cervical lordosis, possibly artifact related to  positioning versus muscular spasm.  2. Degenerative changes as described. Central canal stenosis with associated  cord compression and vague cord signal abnormality C3-C4 through C6-C7.

## 2020-10-15 NOTE — CONSULT NOTE ADULT - SUBJECTIVE AND OBJECTIVE BOX
Neurosurgery:    70M w/ hx of HTN, HLD, KRISTINA, and seasonal affective disorder who presents with dizziness since Saturday. The pt bent down while playing golf and when he stood up he felt dizzy and blurry vision. States he felt off balance, not that the room was spinning. It lasted for a couple of minutes and resolved on its own. The patient did take dilaudid prior to playing golf. The same symptoms occurred again on Tuesday prior to admission while bending over as well as on day of admission. + associated nausea. Denies vomiting, numbness, tingling, slurring of speech, weakness, fever, chills, cough, SOB, CP, HA, or urinary symptoms.    PAST MEDICAL & SURGICAL HISTORY:  Seasonal affective disorder  Depression  KRISTINA (obstructive sleep apnea) no cpap  HLD (hyperlipidemia)  HTN (hypertension)  History of back injury  sx lumbar 2013 &amp; 2014 with hardware  History of shoulder surgery right  S/P TKR (total knee replacement) left      FAMILY HISTORY:  No pertinent family history in first degree relatives    Social Hx:   Denies Tob,ETOH, Lives with wife at home    Allergies  NKDA    Home Medications:   * Patient Currently Takes Medications as of 14-Oct-2020 21:42 documented in Structured Notes  · 	ibuprofen 200 mg oral tablet: Last Dose Taken:  , 1 tab(s) orally once a day for back pain  · 	rosuvastatin 5 mg oral tablet: Last Dose Taken:  , 1 tab(s) orally once a day  	***PT HASN'T STARTED YET- WAS SUPPOSED TO START TODAY***  · 	hydroCHLOROthiazide 12.5 mg oral capsule: Last Dose Taken:  , 1 cap(s) orally once a day  · 	losartan 50 mg oral tablet: Last Dose Taken:  , 1 tab(s) orally once a day  · 	HYDROmorphone 4 mg oral tablet: Last Dose Taken:  , 1 tab(s) orally once a day, As Needed  · 	Wellbutrin  mg/24 hours oral tablet, extended release: Last Dose Taken:  , 1 tab(s) orally every 24 hours  · 	ALPRAZolam 0.25 mg oral tablet: Last Dose Taken:  , 1 tab(s) orally once a day, As Needed  · 	aspirin 81 mg oral tablet: Last Dose Taken:  , 1 tab(s) orally once a day      MEDICATIONS  (STANDING):  aspirin  chewable 81 milliGRAM(s) Oral daily  atorvastatin 20 milliGRAM(s) Oral at bedtime  buPROPion XL . 150 milliGRAM(s) Oral daily  hydrochlorothiazide 12.5 milliGRAM(s) Oral daily  influenza   Vaccine 0.5 milliLiter(s) IntraMuscular once  losartan 50 milliGRAM(s) Oral daily      Review of Symptoms  	Gen: no fevers, chills, sweats, weight loss, fatigue  	Visual: no recent changes in vision, no blurriness, no seeing spots  	Cardiovascular: no chest pain, no palpitations, no orthopnea, no leg swelling  	Respiratory: no shortness of breath, no exertional dyspnea, no cough, no rhinorrhea, no nasal congestion  	GI: no difficulty swallowing, + nausea, no vomiting, no abdominal pain, no diarrhea, no constipation, no melana  	: no dysuria, no increased freq, no hematuria, no malodorous urine  	Derm: no wounds, no rashes  	Heme: no easy bleeding or bruising  	MSK: no joint pain, no joint swelling or redness, no extremity pain                   Neuro: + dizziness    Vital Signs Last 24 Hrs  T(C): 36.3 (15 Oct 2020 01:15), Max: 36.6 (14 Oct 2020 23:38)  T(F): 97.4 (15 Oct 2020 01:15), Max: 97.9 (14 Oct 2020 23:38)  HR: 64 (15 Oct 2020 01:15) (64 - 89)  BP: 134/74 (15 Oct 2020 01:15) (123/79 - 137/92)  BP(mean): 105 (14 Oct 2020 15:43) (105 - 105)  RR: 18 (14 Oct 2020 23:38) (18 - 18)  SpO2: 97% (15 Oct 2020 01:15) (96% - 100%)    Labs:                        15.0   6.67  )-----------( 216      ( 14 Oct 2020 16:13 )             44.7     10-14    136  |  107  |  14  ----------------------------<  97  4.2   |  24  |  0.93    Ca    8.7      14 Oct 2020 16:13  Mg     2.1     10-14    TPro  7.2  /  Alb  3.7  /  TBili  0.2  /  DBili  x   /  AST  27  /  ALT  38  /  AlkPhos  76  10-14    PT/INR - ( 14 Oct 2020 16:13 )   PT: 11.1 sec;   INR: 0.95 ratio    PTT - ( 14 Oct 2020 16:13 )  PTT:31.6 sec    Radiology report:  - CT head: Negative  - MRI Brain: No CVA, No heme  - MRI Cspine: IMPRESSION:  1. Reversal of the normal cervical lordosis, possibly artifact related to  positioning versus muscular spasm.  2. Degenerative changes as described. Central canal stenosis with associated  cord compression and vague cord signal abnormality C3-C4 through C6-C7.      Physical Exam:  Constitutional: Awake / alert  HEENT: PERRLA, EOMI  Neck: Supple  Respiratory: Breath sounds are clear bilaterally  Cardiovascular: S1 and S2, regular rhythm  Gastrointestinal: Soft, NT/ND  Extremities:  no edema  Vascular: No carotid Bruit  Musculoskeletal: no joint swelling/tenderness, no abnormal movements  Skin: No rashes    Neurological Exam:  HF: A x O x 3, appropriately interactive, normal affect, speech fluent, no aphasia or paraphasic errors. Naming /repetition intact   CN: PERRL, EOMI, VFF, facial sensation normal, no NLFD, tongue midline  Motor: No pronator drift, Strength 5/5 in all 4 ext, normal bulk and tone, no tremor, rigidity or bradykinesia  Sens: Intact to light touch  Reflexes: Symmetric and normal, downgoing toes b/l  Coord:  No FNFA, dysmetria, JAVIER intact   Gait/Balance: Cannot test at this time    * Will return later to eval for myelpathic focused exam.... Hoffmans, Clonus, Spasticity in LE's.    A/P: 70M w/ hx of HTN, HLD, KRISTINA, and seasonal affective disorder who presents with dizziness since Saturday.  MRI brain neg.  MRI cpsine stenosis with some cord compression noted, possible scant cord signal changes.  Not c/w pt's symptoms of intractable dizziness with Nausea.    - Await MRI read official  - No steroids at this time for cervical spine  - Steroid may help with pt's nausea and vertiginous symptoms  - ENT for [place]-Halpike maneuver or other considerations  - Neurology eval may be considered  - d/w Dr. Muniz will eval further in later am hours and assess gait for spasticity.  - No acute neurosurgical intervention warranted at this time.

## 2020-10-15 NOTE — DISCHARGE NOTE PROVIDER - HOSPITAL COURSE
HPI: 70M w/ hx of HTN, HLD, KRISTINA, and seasonal affective disorder who presents with dizziness since Saturday. The pt bent down while playing golf and when he stood up he felt dizzy and blurry vision. States he felt off balance, not that the room was spinning. It lasted for a couple of minutes and resolved on its own. The patient did take dilaudid prior to playing golf. The same symptoms occurred again on Tuesday prior to admission while bending over as well as on day of admission. + associated nausea. Denies vomiting, numbness, tingling, slurring of speech, weakness, fever, chills, cough, SOB, CP, HA, or urinary symptoms. Pt's wife states pt had gait difficulty preceding dizziness. In ED initial CTA head/neck reveals signs of extensive cervical spine multilevel stenosis concerning for cord encroachement/compression. Neurosurgery evaluated pt who stated no acute surgical intervention. MR head and spine ordered. MR head was negative for acute infarct. MR spine showed cervical spine stenosis with some cord compression Neuro saw pt this AM. State pt likely has peripheral vertigo. Neuro rec hold off on vestibular therapy until seen by spine surgery. Neuro rec pt avoid rapid movements of neck and use meclizine as needed. Orthostatics done which were negative.     Subjective: Pt seen at bedside. AOx3. Denies dizziness, N/V, headache, chest pain, SOB. Hemodynamically stable.     PHYSICAL EXAM:  Vital Signs Last 24 Hrs  T(C): 36.4 (15 Oct 2020 09:37), Max: 36.6 (14 Oct 2020 23:38)  T(F): 97.6 (15 Oct 2020 09:37), Max: 97.9 (14 Oct 2020 23:38)  HR: 71 (15 Oct 2020 09:37) (64 - 89)  BP: 141/80 (15 Oct 2020 09:37) (123/79 - 141/80)  BP(mean): 105 (14 Oct 2020 15:43) (105 - 105)  RR: 18 (15 Oct 2020 09:37) (18 - 18)  SpO2: 97% (15 Oct 2020 09:37) (96% - 100%)    Constitutional: Pt lying in bed, awake and alert, NAD  HEENT: EOMI, normal hearing, moist mucous membranes  Neck: Soft and supple, no JVD  Respiratory: CTABL, No wheezing, rales or rhonchi  Cardiovascular: S1S2+, RRR, no M/G/R  Gastrointestinal: BS+, soft, NT/ND, no guarding, no rebound  Extremities: No peripheral edema  Vascular: 2+ peripheral pulses  Neurological: AAOx3, no focal deficits  Musculoskeletal: 5/5 strength b/l upper and lower extremities  Skin: No rashes    MEDICATIONS:  MEDICATIONS  (STANDING):  aspirin  chewable 81 milliGRAM(s) Oral daily  atorvastatin 20 milliGRAM(s) Oral at bedtime  buPROPion XL . 150 milliGRAM(s) Oral daily  hydrochlorothiazide 12.5 milliGRAM(s) Oral daily  influenza   Vaccine 0.5 milliLiter(s) IntraMuscular once  losartan 50 milliGRAM(s) Oral daily      LABS: All Labs Reviewed:                        14.0   5.00  )-----------( 171      ( 15 Oct 2020 08:46 )             41.6     10-15    140  |  110<H>  |  12  ----------------------------<  92  3.9   |  26  |  0.76    Ca    9.1      15 Oct 2020 08:46  Mg     2.1     10-14    TPro  6.6  /  Alb  3.4  /  TBili  0.3  /  DBili  x   /  AST  19  /  ALT  34  /  AlkPhos  66  10-15    PT/INR - ( 14 Oct 2020 16:13 )   PT: 11.1 sec;   INR: 0.95 ratio        RADIOLOGY/EKG:  < from: CT Angio Head w/ IV Cont (10.14.20 @ 18:15) >      IMPRESSION    1. Involutional changes in both hemispheres without an acute infarct or hemorrhage. No hydrocephalus or midline shift. No space-occupying lesion noted. Follow-up MR imaging of the brain may be considered for further assessment.  2. Calcified and noncalcified plaque both carotid bifurcations with at least moderate narrowing of both proximal internal carotid arteries. This may be further assessed with Doppler.  3. Both internal carotid arteries are markedly tortuous but widely patent. Also the vertebral arteries are bilaterally patent.  4. Tortuous but widely patent intracerebral vasculature also noted.  5. Extensive degenerative changes of the cervical spine with multilevel stenosis. Cord encroachment is suggested from C4 to C7.This may be further assessed with MR imaging as well.    < end of copied text >    < from: MR Head No Cont (10.14.20 @ 23:30) >  < from: MR Cervical Spine No Cont (10.14.20 @ 23:30) >      IMPRESSION:    There is reversal of the usual cervical lordosis without fracture and grade 1 anterolisthesis of C2 with respect to C3.    At C4-C5 large central osteophyte with ligamentum flavum infolding contribute to severe spinal canal narrowing and mild cord compression. There is no significant T2/STIR signal abnormality to suggest myelomalacia or cord edema.    At C5-C6, there is severe central canal narrowing and cord impingement.    There are cervical spondylitic changes contributing to moderate to severe neural foraminal narrowing as described above level by level.

## 2020-10-15 NOTE — CHART NOTE - NSCHARTNOTEFT_GEN_A_CORE
Patient seen and examined with Dr. Muniz.  Exam stable, neurologically intact. no hoffmans no clonus, normal reflexes.  SHIELDS 5/5.  Patient endorses symptoms of dizziness resolved.  MRI reviewed and no neurosurgical intervention warranted.  Made patient aware of MR findings of cervical stenosis and educated on sign and symptoms to be aware of moving forward and activities to avoid.  Patient stated they will follow-up with their spine doctor who previously operated on him.      Will sign-off, reconsult if any acute neuro changes occur.

## 2020-10-15 NOTE — DISCHARGE NOTE PROVIDER - NSDCCPCAREPLAN_GEN_ALL_CORE_FT
PRINCIPAL DISCHARGE DIAGNOSIS  Diagnosis: Dizziness  Assessment and Plan of Treatment: Dizziness  -Your MRI brain negative for acute infarct. Your MRI did show some cervical stenosis with some cord compression  -Neurosurgery evaluated you and rec no acute surgical intervention  -Neurology evaluated and belive symptoms likely due to peripheral vertigo. Rec holding off on vestibular therapy until you see your spine surgeon  -Try to avoid rapid movements of neck   -We will prescibe you meclizine which you can use once a day as needed for dizziness  -Please followup with your spine surgeon      SECONDARY DISCHARGE DIAGNOSES  Diagnosis: Seasonal affective disorder  Assessment and Plan of Treatment: -continue wellbutrin    Diagnosis: Hyperlipidemia  Assessment and Plan of Treatment: -continue statin    Diagnosis: Hypertension  Assessment and Plan of Treatment: -continue HCTZ  -continue losartan

## 2020-10-15 NOTE — DISCHARGE NOTE NURSING/CASE MANAGEMENT/SOCIAL WORK - PATIENT PORTAL LINK FT
You can access the FollowMyHealth Patient Portal offered by [facility] by registering at the following website: [email or web address]. By joining [employer]’s FollowMyHealth portal, you will also be able to view your health information using other applications (apps) compatible with our system.

## 2020-10-15 NOTE — DISCHARGE NOTE PROVIDER - CARE PROVIDER_API CALL
Wilber Sinha)  Cardiovascular Disease; Internal Medicine  [address], [address]  [place] [address]  Phone: [phone]  Fax: [phone]  Follow Up Time:

## 2020-10-15 NOTE — CONSULT NOTE ADULT - ASSESSMENT
70 year old man presenting with dizziness for several days.  His wife also reports that he has had gait difficulty preceding the dizziness.    Dizziness  -MRI brain negative for acute infarct  -Likely peripheral vertigo  -Would hold off on vestibular therapy until he is seen by his spine surgery  - I would avoid rapid movements of neck at this time.  -Can use meclizine prn    Gait instability  -Likely multifactorial - he has evidence of neuropathy on exam and cord compression on MRI c-spine  -No evidence of myelopathy on physical exam but this may be due to superimposed neuropathy  -Patient prefers to f/u with his spine surgeon  -Discussed risk of falls with wife.    Discussed with Dr. Carbajal

## 2020-10-15 NOTE — DISCHARGE NOTE PROVIDER - NSDCMRMEDTOKEN_GEN_ALL_CORE_FT
ALPRAZolam 0.25 mg oral tablet: 1 tab(s) orally once a day, As Needed  aspirin 81 mg oral tablet: 1 tab(s) orally once a day  hydroCHLOROthiazide 12.5 mg oral capsule: 1 cap(s) orally once a day  HYDROmorphone 4 mg oral tablet: 1 tab(s) orally once a day, As Needed  ibuprofen 200 mg oral tablet: 1 tab(s) orally once a day for back pain  losartan 50 mg oral tablet: 1 tab(s) orally once a day  meclizine 25 mg oral tablet: 25 milligram(s) orally once a day, As Needed  -for dizziness   rosuvastatin 5 mg oral tablet: 1 tab(s) orally once a day  ***PT HASN&#x27;T STARTED YET- WAS SUPPOSED TO START TODAY***  Wellbutrin  mg/24 hours oral tablet, extended release: 1 tab(s) orally every 24 hours

## 2020-10-16 LAB
SARS-COV-2 IGG SERPL QL IA: NEGATIVE — SIGNIFICANT CHANGE UP
SARS-COV-2 IGM SERPL IA-ACNC: <3.8 AU/ML — SIGNIFICANT CHANGE UP

## 2020-10-19 ENCOUNTER — APPOINTMENT (OUTPATIENT)
Dept: ORTHOPEDIC SURGERY | Facility: CLINIC | Age: 70
End: 2020-10-19
Payer: MEDICARE

## 2020-10-19 VITALS
HEIGHT: 69 IN | TEMPERATURE: 97.2 F | DIASTOLIC BLOOD PRESSURE: 82 MMHG | SYSTOLIC BLOOD PRESSURE: 126 MMHG | BODY MASS INDEX: 42.21 KG/M2 | WEIGHT: 285 LBS | HEART RATE: 116 BPM

## 2020-10-19 DIAGNOSIS — Z82.61 FAMILY HISTORY OF ARTHRITIS: ICD-10-CM

## 2020-10-19 DIAGNOSIS — Z86.39 PERSONAL HISTORY OF OTHER ENDOCRINE, NUTRITIONAL AND METABOLIC DISEASE: ICD-10-CM

## 2020-10-19 DIAGNOSIS — Z87.39 PERSONAL HISTORY OF OTHER DISEASES OF THE MUSCULOSKELETAL SYSTEM AND CONNECTIVE TISSUE: ICD-10-CM

## 2020-10-19 DIAGNOSIS — Z86.79 PERSONAL HISTORY OF OTHER DISEASES OF THE CIRCULATORY SYSTEM: ICD-10-CM

## 2020-10-19 PROCEDURE — 99204 OFFICE O/P NEW MOD 45 MIN: CPT

## 2020-10-19 PROCEDURE — 72040 X-RAY EXAM NECK SPINE 2-3 VW: CPT

## 2020-10-19 RX ORDER — ESCITALOPRAM OXALATE 10 MG/1
10 TABLET ORAL
Qty: 90 | Refills: 0 | Status: ACTIVE | COMMUNITY
Start: 2020-06-14

## 2020-10-19 RX ORDER — ROSUVASTATIN CALCIUM 5 MG/1
TABLET, FILM COATED ORAL
Refills: 0 | Status: ACTIVE | COMMUNITY

## 2020-10-19 RX ORDER — ATORVASTATIN CALCIUM 20 MG/1
20 TABLET, FILM COATED ORAL
Qty: 90 | Refills: 0 | Status: ACTIVE | COMMUNITY
Start: 2020-09-27

## 2020-10-19 RX ORDER — HYDROCHLOROTHIAZIDE 12.5 MG/1
12.5 CAPSULE ORAL
Qty: 90 | Refills: 0 | Status: ACTIVE | COMMUNITY
Start: 2020-09-17

## 2020-10-19 RX ORDER — BUPROPION HYDROCHLORIDE 150 MG/1
150 TABLET, EXTENDED RELEASE ORAL
Qty: 90 | Refills: 0 | Status: ACTIVE | COMMUNITY
Start: 2020-08-14

## 2020-10-19 RX ORDER — LOSARTAN POTASSIUM 50 MG/1
50 TABLET, FILM COATED ORAL
Qty: 90 | Refills: 0 | Status: ACTIVE | COMMUNITY
Start: 2020-09-17

## 2020-10-19 RX ORDER — ROSUVASTATIN CALCIUM 5 MG/1
5 TABLET, FILM COATED ORAL
Qty: 30 | Refills: 0 | Status: ACTIVE | COMMUNITY
Start: 2020-10-12

## 2020-10-20 ENCOUNTER — TRANSCRIPTION ENCOUNTER (OUTPATIENT)
Age: 70
End: 2020-10-20

## 2020-10-20 DIAGNOSIS — G47.33 OBSTRUCTIVE SLEEP APNEA (ADULT) (PEDIATRIC): ICD-10-CM
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
## 2020-10-20 DIAGNOSIS — G95.29 OTHER CORD COMPRESSION: ICD-10-CM

## 2020-10-20 DIAGNOSIS — Z87.891 PERSONAL HISTORY OF NICOTINE DEPENDENCE: ICD-10-CM

## 2020-10-20 DIAGNOSIS — E78.5 HYPERLIPIDEMIA, UNSPECIFIED: ICD-10-CM

## 2020-10-20 DIAGNOSIS — R27.0 ATAXIA, UNSPECIFIED: ICD-10-CM

## 2020-10-20 DIAGNOSIS — F33.9 MAJOR DEPRESSIVE DISORDER, RECURRENT, UNSPECIFIED: ICD-10-CM

## 2020-10-20 DIAGNOSIS — M48.02 SPINAL STENOSIS, CERVICAL REGION: ICD-10-CM

## 2020-10-20 DIAGNOSIS — I10 ESSENTIAL (PRIMARY) HYPERTENSION: ICD-10-CM

## 2020-10-20 DIAGNOSIS — Z96.652 PRESENCE OF LEFT ARTIFICIAL KNEE JOINT: ICD-10-CM

## 2020-10-20 DIAGNOSIS — Z79.82 LONG TERM (CURRENT) USE OF ASPIRIN: ICD-10-CM
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
## 2020-10-20 DIAGNOSIS — H81.10 BENIGN PAROXYSMAL VERTIGO, UNSPECIFIED EAR: ICD-10-CM

## 2020-10-22 DIAGNOSIS — Z23 ENCOUNTER FOR IMMUNIZATION: ICD-10-CM

## 2020-11-18 ENCOUNTER — APPOINTMENT (OUTPATIENT)
Dept: ORTHOPEDIC SURGERY | Facility: CLINIC | Age: 70
End: 2020-11-18
Payer: MEDICARE

## 2020-11-18 VITALS — BODY MASS INDEX: 12.59 KG/M2 | WEIGHT: 85 LBS | HEIGHT: 69 IN | TEMPERATURE: 97 F

## 2020-11-18 PROCEDURE — 99214 OFFICE O/P EST MOD 30 MIN: CPT

## 2020-11-23 ENCOUNTER — APPOINTMENT (OUTPATIENT)
Dept: SPINE | Facility: CLINIC | Age: 70
End: 2020-11-23

## 2021-03-16 ENCOUNTER — OUTPATIENT (OUTPATIENT)
Dept: OUTPATIENT SERVICES | Facility: HOSPITAL | Age: 71
LOS: 1 days | End: 2021-03-16
Payer: MEDICARE

## 2021-03-16 DIAGNOSIS — Z87.828 PERSONAL HISTORY OF OTHER (HEALED) PHYSICAL INJURY AND TRAUMA: Chronic | ICD-10-CM

## 2021-03-16 DIAGNOSIS — Z96.659 PRESENCE OF UNSPECIFIED ARTIFICIAL KNEE JOINT: Chronic | ICD-10-CM

## 2021-03-16 DIAGNOSIS — Z98.890 OTHER SPECIFIED POSTPROCEDURAL STATES: Chronic | ICD-10-CM

## 2021-03-16 DIAGNOSIS — Z20.828 CONTACT WITH AND (SUSPECTED) EXPOSURE TO OTHER VIRAL COMMUNICABLE DISEASES: ICD-10-CM

## 2021-03-16 LAB — SARS-COV-2 RNA SPEC QL NAA+PROBE: SIGNIFICANT CHANGE UP

## 2021-03-16 PROCEDURE — U0003: CPT

## 2021-03-16 PROCEDURE — U0005: CPT

## 2021-03-18 ENCOUNTER — OUTPATIENT (OUTPATIENT)
Dept: OUTPATIENT SERVICES | Facility: HOSPITAL | Age: 71
LOS: 1 days | End: 2021-03-18
Payer: MEDICARE

## 2021-03-18 DIAGNOSIS — Z87.828 PERSONAL HISTORY OF OTHER (HEALED) PHYSICAL INJURY AND TRAUMA: Chronic | ICD-10-CM

## 2021-03-18 DIAGNOSIS — M54.16 RADICULOPATHY, LUMBAR REGION: ICD-10-CM

## 2021-03-18 DIAGNOSIS — Z98.890 OTHER SPECIFIED POSTPROCEDURAL STATES: Chronic | ICD-10-CM

## 2021-03-18 DIAGNOSIS — Z96.659 PRESENCE OF UNSPECIFIED ARTIFICIAL KNEE JOINT: Chronic | ICD-10-CM

## 2021-03-18 PROCEDURE — 64483 NJX AA&/STRD TFRM EPI L/S 1: CPT | Mod: RT

## 2021-03-27 ENCOUNTER — TRANSCRIPTION ENCOUNTER (OUTPATIENT)
Age: 71
End: 2021-03-27

## 2021-05-04 ENCOUNTER — OUTPATIENT (OUTPATIENT)
Dept: OUTPATIENT SERVICES | Facility: HOSPITAL | Age: 71
LOS: 1 days | End: 2021-05-04
Payer: MEDICARE

## 2021-05-04 DIAGNOSIS — Z98.890 OTHER SPECIFIED POSTPROCEDURAL STATES: Chronic | ICD-10-CM

## 2021-05-04 DIAGNOSIS — Z87.828 PERSONAL HISTORY OF OTHER (HEALED) PHYSICAL INJURY AND TRAUMA: Chronic | ICD-10-CM

## 2021-05-04 DIAGNOSIS — Z20.828 CONTACT WITH AND (SUSPECTED) EXPOSURE TO OTHER VIRAL COMMUNICABLE DISEASES: ICD-10-CM

## 2021-05-04 DIAGNOSIS — Z96.659 PRESENCE OF UNSPECIFIED ARTIFICIAL KNEE JOINT: Chronic | ICD-10-CM

## 2021-05-04 LAB — SARS-COV-2 RNA SPEC QL NAA+PROBE: SIGNIFICANT CHANGE UP

## 2021-05-04 PROCEDURE — U0003: CPT

## 2021-05-04 PROCEDURE — U0005: CPT

## 2021-05-06 ENCOUNTER — OUTPATIENT (OUTPATIENT)
Dept: OUTPATIENT SERVICES | Facility: HOSPITAL | Age: 71
LOS: 1 days | End: 2021-05-06
Payer: MEDICARE

## 2021-05-06 DIAGNOSIS — M54.16 RADICULOPATHY, LUMBAR REGION: ICD-10-CM

## 2021-05-06 DIAGNOSIS — Z87.828 PERSONAL HISTORY OF OTHER (HEALED) PHYSICAL INJURY AND TRAUMA: Chronic | ICD-10-CM

## 2021-05-06 DIAGNOSIS — Z96.659 PRESENCE OF UNSPECIFIED ARTIFICIAL KNEE JOINT: Chronic | ICD-10-CM

## 2021-05-06 DIAGNOSIS — Z98.890 OTHER SPECIFIED POSTPROCEDURAL STATES: Chronic | ICD-10-CM

## 2021-05-06 PROCEDURE — 62323 NJX INTERLAMINAR LMBR/SAC: CPT

## 2021-05-24 ENCOUNTER — OUTPATIENT (OUTPATIENT)
Dept: OUTPATIENT SERVICES | Facility: HOSPITAL | Age: 71
LOS: 1 days | End: 2021-05-24
Payer: MEDICARE

## 2021-05-24 DIAGNOSIS — Z87.828 PERSONAL HISTORY OF OTHER (HEALED) PHYSICAL INJURY AND TRAUMA: Chronic | ICD-10-CM

## 2021-05-24 DIAGNOSIS — M54.16 RADICULOPATHY, LUMBAR REGION: ICD-10-CM

## 2021-05-24 DIAGNOSIS — Z96.659 PRESENCE OF UNSPECIFIED ARTIFICIAL KNEE JOINT: Chronic | ICD-10-CM

## 2021-05-24 DIAGNOSIS — Z98.890 OTHER SPECIFIED POSTPROCEDURAL STATES: Chronic | ICD-10-CM

## 2021-05-24 PROCEDURE — 62323 NJX INTERLAMINAR LMBR/SAC: CPT

## 2021-09-17 ENCOUNTER — OUTPATIENT (OUTPATIENT)
Dept: OUTPATIENT SERVICES | Facility: HOSPITAL | Age: 71
LOS: 1 days | End: 2021-09-17
Payer: MEDICARE

## 2021-09-17 DIAGNOSIS — Z98.890 OTHER SPECIFIED POSTPROCEDURAL STATES: Chronic | ICD-10-CM

## 2021-09-17 DIAGNOSIS — Z96.659 PRESENCE OF UNSPECIFIED ARTIFICIAL KNEE JOINT: Chronic | ICD-10-CM

## 2021-09-17 DIAGNOSIS — Z20.828 CONTACT WITH AND (SUSPECTED) EXPOSURE TO OTHER VIRAL COMMUNICABLE DISEASES: ICD-10-CM

## 2021-09-17 DIAGNOSIS — Z87.828 PERSONAL HISTORY OF OTHER (HEALED) PHYSICAL INJURY AND TRAUMA: Chronic | ICD-10-CM

## 2021-09-17 LAB — SARS-COV-2 RNA SPEC QL NAA+PROBE: SIGNIFICANT CHANGE UP

## 2021-09-17 PROCEDURE — U0005: CPT

## 2021-09-17 PROCEDURE — U0003: CPT

## 2021-09-20 ENCOUNTER — OUTPATIENT (OUTPATIENT)
Dept: OUTPATIENT SERVICES | Facility: HOSPITAL | Age: 71
LOS: 1 days | End: 2021-09-20
Payer: MEDICARE

## 2021-09-20 DIAGNOSIS — Z96.659 PRESENCE OF UNSPECIFIED ARTIFICIAL KNEE JOINT: Chronic | ICD-10-CM

## 2021-09-20 DIAGNOSIS — Z87.828 PERSONAL HISTORY OF OTHER (HEALED) PHYSICAL INJURY AND TRAUMA: Chronic | ICD-10-CM

## 2021-09-20 DIAGNOSIS — Z98.890 OTHER SPECIFIED POSTPROCEDURAL STATES: Chronic | ICD-10-CM

## 2021-09-20 DIAGNOSIS — M54.16 RADICULOPATHY, LUMBAR REGION: ICD-10-CM

## 2021-09-20 PROCEDURE — 62323 NJX INTERLAMINAR LMBR/SAC: CPT

## 2021-11-12 NOTE — ED ADULT NURSE NOTE - BREATH SOUNDS, LEFT
-- DO NOT REPLY / DO NOT REPLY ALL --  -- Message is from the [facility]--    General Patient Message      Reason for Call: Patient mother says that she is returning a call from the office. Please call back at [phone].    Caller Information       Type Contact Phone    11/12/2021 03:58 PM CST Phone (Incoming) TESSA SANCHEZ (Mother) [phone]          Alternative phone number: n/a    Turnaround time given to caller:   \"This message will be sent to [state Provider's name]. The clinical team will fulfill your request as soon as they review your message.\"     clear

## 2021-11-16 ENCOUNTER — OUTPATIENT (OUTPATIENT)
Dept: OUTPATIENT SERVICES | Facility: HOSPITAL | Age: 71
LOS: 1 days | End: 2021-11-16
Payer: MEDICARE

## 2021-11-16 DIAGNOSIS — Z98.890 OTHER SPECIFIED POSTPROCEDURAL STATES: Chronic | ICD-10-CM

## 2021-11-16 DIAGNOSIS — Z20.828 CONTACT WITH AND (SUSPECTED) EXPOSURE TO OTHER VIRAL COMMUNICABLE DISEASES: ICD-10-CM

## 2021-11-16 DIAGNOSIS — Z87.828 PERSONAL HISTORY OF OTHER (HEALED) PHYSICAL INJURY AND TRAUMA: Chronic | ICD-10-CM

## 2021-11-16 DIAGNOSIS — Z96.659 PRESENCE OF UNSPECIFIED ARTIFICIAL KNEE JOINT: Chronic | ICD-10-CM

## 2021-11-16 LAB — SARS-COV-2 RNA SPEC QL NAA+PROBE: SIGNIFICANT CHANGE UP

## 2021-11-16 PROCEDURE — U0005: CPT

## 2021-11-16 PROCEDURE — U0003: CPT

## 2021-11-18 ENCOUNTER — OUTPATIENT (OUTPATIENT)
Dept: OUTPATIENT SERVICES | Facility: HOSPITAL | Age: 71
LOS: 1 days | End: 2021-11-18
Payer: MEDICARE

## 2021-11-18 DIAGNOSIS — Z87.828 PERSONAL HISTORY OF OTHER (HEALED) PHYSICAL INJURY AND TRAUMA: Chronic | ICD-10-CM

## 2021-11-18 DIAGNOSIS — Z98.890 OTHER SPECIFIED POSTPROCEDURAL STATES: Chronic | ICD-10-CM

## 2021-11-18 DIAGNOSIS — Z96.659 PRESENCE OF UNSPECIFIED ARTIFICIAL KNEE JOINT: Chronic | ICD-10-CM

## 2021-11-18 DIAGNOSIS — M54.16 RADICULOPATHY, LUMBAR REGION: ICD-10-CM

## 2021-11-18 PROCEDURE — 62323 NJX INTERLAMINAR LMBR/SAC: CPT

## 2022-06-29 NOTE — ED PROVIDER NOTE - ENMT, MLM
Airway patent, Nasal mucosa clear. Mouth with normal mucosa. Throat has no vesicles, no oropharyngeal exudates and uvula is midline. Instructions: This plan will send the code FBSD to the PM system.  DO NOT or CHANGE the price. Price (Do Not Change): 0.00 Detail Level: Simple

## 2023-01-01 NOTE — PATIENT PROFILE ADULT - NSPROMEDSADMININFO_GEN_A_NUR
1900 Received baby in an open crib attached to cardiac monitor not in respiratory distress on room air, NGT in placed.   no concerns

## 2023-03-20 ENCOUNTER — APPOINTMENT (OUTPATIENT)
Dept: PAIN MANAGEMENT | Facility: CLINIC | Age: 73
End: 2023-03-20
Payer: MEDICARE

## 2023-03-20 VITALS — BODY MASS INDEX: 32.58 KG/M2 | HEIGHT: 69 IN | WEIGHT: 220 LBS

## 2023-03-20 DIAGNOSIS — Z98.1 ARTHRODESIS STATUS: ICD-10-CM

## 2023-03-20 PROCEDURE — 99213 OFFICE O/P EST LOW 20 MIN: CPT | Mod: 95

## 2023-03-20 NOTE — DISCUSSION/SUMMARY
[Medication Risks Reviewed] : Medication risks reviewed [de-identified] : Prescriptions renewed. [place]obt[place]  reviewed and appropriate. SOAPP-R completed on chart. The patient's medications are documented to the best of their ability. Quality of life and functional ability improved on medications. The patient is showing no aberrant behavior or evidence of diversion. The patient was advised not to use narcotic medication while operating an automobile or heavy machinery due to potential sedation or dizziness. The patient was educated to the risks associated with potential opioid dependence and addiction. Urine toxicology screens as per office protocol. Use of multimodal analgesia used prn.\par Follow up prn.

## 2023-03-20 NOTE — HISTORY OF PRESENT ILLNESS
[Lower back] : lower back [Gradual] : gradual [2] : 2 [Dull/Aching] : dull/aching [Constant] : constant [Rest] : rest [Meds] : meds [Ice] : ice [Heat] : heat [Walking/activity] : walking/activity [Standing] : standing [Part time] : Work status: part time [3] : 3 [Steroid] : Steroid [FreeTextEntry1] : States his neckl and back pain well controlled. S/P both  Cervical and Lumbar fusions in the past. Uses Dilaudid prn for pain with relief. He continues working Part-time. [] : Post Surgical Visit: no [FreeTextEntry8] : NONE  [de-identified] : 03/2022 [de-identified] : HOME EXERCISE PROGRAM 6-7X WEEK  [de-identified] : LUMBAR [de-identified] : EPIDURAL

## 2023-03-20 NOTE — REASON FOR VISIT
[Follow-Up Visit] : a follow-up pain management visit [Home] : at home, [unfilled] , at the time of the visit. [Medical Office: ([facility])___] : at the medical office located in  [Patient] : the patient [Self] : self [FreeTextEntry2] : Neck and back pain

## 2023-05-22 ENCOUNTER — NON-APPOINTMENT (OUTPATIENT)
Age: 73
End: 2023-05-22

## 2023-05-25 ENCOUNTER — APPOINTMENT (OUTPATIENT)
Dept: OTOLARYNGOLOGY | Facility: CLINIC | Age: 73
End: 2023-05-25
Payer: MEDICARE

## 2023-05-25 VITALS — HEIGHT: 68 IN | TEMPERATURE: 97 F | WEIGHT: 225 LBS | BODY MASS INDEX: 34.1 KG/M2

## 2023-05-25 DIAGNOSIS — H90.A21 SENSORINEURAL HEARING LOSS, UNILATERAL, RIGHT EAR, WITH RESTRICTED HEARING ON THE CONTRALATERAL SIDE: ICD-10-CM

## 2023-05-25 DIAGNOSIS — H61.22 IMPACTED CERUMEN, LEFT EAR: ICD-10-CM

## 2023-05-25 DIAGNOSIS — H93.8X3 OTHER SPECIFIED DISORDERS OF EAR, BILATERAL: ICD-10-CM

## 2023-05-25 DIAGNOSIS — J34.3 HYPERTROPHY OF NASAL TURBINATES: ICD-10-CM

## 2023-05-25 DIAGNOSIS — H90.3 SENSORINEURAL HEARING LOSS, BILATERAL: ICD-10-CM

## 2023-05-25 DIAGNOSIS — H61.21 IMPACTED CERUMEN, RIGHT EAR: ICD-10-CM

## 2023-05-25 PROCEDURE — 31231 NASAL ENDOSCOPY DX: CPT

## 2023-05-25 PROCEDURE — 92557 COMPREHENSIVE HEARING TEST: CPT

## 2023-05-25 PROCEDURE — 92567 TYMPANOMETRY: CPT

## 2023-05-25 PROCEDURE — 99204 OFFICE O/P NEW MOD 45 MIN: CPT | Mod: 25

## 2023-05-25 PROCEDURE — G0268 REMOVAL OF IMPACTED WAX MD: CPT

## 2023-05-25 RX ORDER — ALPRAZOLAM 0.5 MG/1
0.5 TABLET ORAL
Refills: 0 | Status: ACTIVE | COMMUNITY

## 2023-05-25 NOTE — PHYSICAL EXAM
[Midline] : trachea located in midline position [Normal] : no rashes [de-identified] : left impacted cerumen ; xs cerumen right  [de-identified] : after cerumen removal  [de-identified] : mild inf turb hyperftrohy s

## 2023-05-25 NOTE — HISTORY OF PRESENT ILLNESS
[de-identified] : c/o clogged left ear - for past 10 days - improving now.  Occurred suddenlty. Worse on left but occ right. No prior ear problems .   Did have ? URI last week but now improved.

## 2023-05-25 NOTE — REASON FOR VISIT
[Initial Consultation] : an initial consultation for [FreeTextEntry2] : clogged ear/nasal congestion

## 2023-05-25 NOTE — DATA REVIEWED
[de-identified] : Asymmetric SNHL\par Mild to severe SNHL 5365-7427 Hz right ear\par Mild to moderate SNL 8955-2835 Hz left ear Type A right ear\par Type C left ear\par

## 2023-05-25 NOTE — ASSESSMENT
[FreeTextEntry1] : Patient with c/o occ nasal congestion and clogged ears - worse on left.  Has left impacted cerumen = exam normal after .  No evidence of sinus infection.  Has asymetric snhl worse in left ear.  Feel current congestion likley resolving URI or mild allergies - recommended conservative care - can use OTC flonase. Also would get MRi for hearng asymetry - if negative followup in 6 mos for repeat audio and possible cerumen .

## 2023-05-25 NOTE — REVIEW OF SYSTEMS
[Ear Noises] : ear noises [Nasal Congestion] : nasal congestion [Throat Clearing] : throat clearing [Negative] : Heme/Lymph [de-identified] : fullness more on left than right [de-identified] : slight pain when swallowing

## 2023-06-14 ENCOUNTER — APPOINTMENT (OUTPATIENT)
Dept: PAIN MANAGEMENT | Facility: CLINIC | Age: 73
End: 2023-06-14
Payer: MEDICARE

## 2023-06-14 VITALS — WEIGHT: 225 LBS | BODY MASS INDEX: 34.1 KG/M2 | HEIGHT: 68 IN

## 2023-06-14 PROCEDURE — 99213 OFFICE O/P EST LOW 20 MIN: CPT

## 2023-06-14 NOTE — HISTORY OF PRESENT ILLNESS
[Lower back] : lower back [Gradual] : gradual [2] : 2 [Dull/Aching] : dull/aching [Constant] : constant [Rest] : rest [Meds] : meds [Ice] : ice [Heat] : heat [Walking/activity] : walking/activity [Standing] : standing [Part time] : Work status: part time [3] : 3 [Steroid] : Steroid [] : Post Surgical Visit: no [FreeTextEntry8] : NONE  [de-identified] : 03/2022 [de-identified] : HOME EXERCISE PROGRAM 6-7X WEEK  [de-identified] : LUMBAR [de-identified] : EPIDURAL

## 2023-06-15 ENCOUNTER — NON-APPOINTMENT (OUTPATIENT)
Age: 73
End: 2023-06-15

## 2023-09-12 ENCOUNTER — APPOINTMENT (OUTPATIENT)
Dept: PAIN MANAGEMENT | Facility: CLINIC | Age: 73
End: 2023-09-12
Payer: MEDICARE

## 2023-09-12 VITALS — HEIGHT: 68 IN | WEIGHT: 230 LBS | BODY MASS INDEX: 34.86 KG/M2

## 2023-09-12 PROCEDURE — 99213 OFFICE O/P EST LOW 20 MIN: CPT

## 2023-10-23 ENCOUNTER — APPOINTMENT (OUTPATIENT)
Dept: OTOLARYNGOLOGY | Facility: CLINIC | Age: 73
End: 2023-10-23
Payer: MEDICARE

## 2023-10-23 VITALS — BODY MASS INDEX: 34.86 KG/M2 | TEMPERATURE: 97.6 F | HEIGHT: 68 IN | WEIGHT: 230 LBS

## 2023-10-23 DIAGNOSIS — R42 DIZZINESS AND GIDDINESS: ICD-10-CM

## 2023-10-23 DIAGNOSIS — H90.3 SENSORINEURAL HEARING LOSS, BILATERAL: ICD-10-CM

## 2023-10-23 DIAGNOSIS — H61.23 IMPACTED CERUMEN, BILATERAL: ICD-10-CM

## 2023-10-23 DIAGNOSIS — R26.89 OTHER ABNORMALITIES OF GAIT AND MOBILITY: ICD-10-CM

## 2023-10-23 PROCEDURE — G0268 REMOVAL OF IMPACTED WAX MD: CPT

## 2023-10-23 PROCEDURE — 92557 COMPREHENSIVE HEARING TEST: CPT

## 2023-10-23 PROCEDURE — 92567 TYMPANOMETRY: CPT

## 2023-10-23 PROCEDURE — 99214 OFFICE O/P EST MOD 30 MIN: CPT | Mod: 25

## 2023-11-17 ENCOUNTER — APPOINTMENT (OUTPATIENT)
Dept: OTOLARYNGOLOGY | Facility: CLINIC | Age: 73
End: 2023-11-17

## 2024-01-12 ENCOUNTER — APPOINTMENT (OUTPATIENT)
Dept: PAIN MANAGEMENT | Facility: CLINIC | Age: 74
End: 2024-01-12
Payer: MEDICARE

## 2024-01-12 PROCEDURE — 99213 OFFICE O/P EST LOW 20 MIN: CPT | Mod: 95

## 2024-01-12 NOTE — HISTORY OF PRESENT ILLNESS
[Lower back] : lower back [Gradual] : gradual [1] : 2 [Dull/Aching] : dull/aching [Constant] : constant [Rest] : rest [Meds] : meds [Ice] : ice [Heat] : heat [Walking/activity] : walking/activity [Standing] : standing [Part time] : Work status: part time [3] : 3 [Steroid] : Steroid [FreeTextEntry1] : Chronic low back pain stable. Meds effective prn. No new issues, He belongs to a local [employer] and walks the indoor track for exercise.  [] : no [FreeTextEntry8] : NONE  [de-identified] : 03/2022 [de-identified] : PERFORMING HOME EXERCISES / STRETCHES DAILY AS TOLERATED AS OF 2024-01-12

## 2024-01-12 NOTE — DISCUSSION/SUMMARY
[Medication Risks Reviewed] : Medication risks reviewed [de-identified] : Prescriptions renewed. [place] and [place]. SOAPP-R completed on chart. The patient's medications are documented to the best of their ability. Quality of life and functional ability improved on medications. The patient is showing no aberrant behavior or evidence of diversion. The patient was advised not to use narcotic medication while operating an automobile or heavy machinery due to potential sedation or dizziness. The patient was educated to the risks associated with potential opioid dependence and addiction. Urine toxicology screens as per office protocol. Use of multimodal analgesia used prn. Follow up one month.

## 2024-01-12 NOTE — REASON FOR VISIT
[Follow-Up Visit] : a follow-up pain management visit [Home] : at home, [unfilled] , at the time of the visit. [Medical Office: ([facility])___] : at the medical office located in  [Patient] : the patient [Self] : self [FreeTextEntry2] : MED REFILL

## 2024-02-20 ENCOUNTER — APPOINTMENT (OUTPATIENT)
Dept: PAIN MANAGEMENT | Facility: CLINIC | Age: 74
End: 2024-02-20
Payer: MEDICARE

## 2024-02-20 PROCEDURE — 99213 OFFICE O/P EST LOW 20 MIN: CPT

## 2024-02-20 RX ORDER — NALOXONE HYDROCHLORIDE 4 MG/.1ML
4 SPRAY NASAL
Qty: 1 | Refills: 0 | Status: ACTIVE | COMMUNITY
Start: 2023-09-12 | End: 1900-01-01

## 2024-02-20 NOTE — HISTORY OF PRESENT ILLNESS
[Lower back] : lower back [Gradual] : gradual [1] : 2 [Sharp] : sharp [Constant] : constant [Rest] : rest [Meds] : meds [Ice] : ice [Heat] : heat [Walking/activity] : walking/activity [Standing] : standing [Part time] : Work status: part time [3] : 3 [Steroid] : Steroid [FreeTextEntry1] : States back pain stable. Remains active and golfs as tolerated. Meds effective prn.  [] : no [FreeTextEntry8] : NONE  [de-identified] : 03/2022 [de-identified] : PERFORMING HOME EXERCISES / STRETCHES DAILY AS TOLERATED AS OF 2024-02-20

## 2024-02-20 NOTE — DISCUSSION/SUMMARY
[Medication Risks Reviewed] : Medication risks reviewed [de-identified] : Prescriptions renewed. [place] and [place]. SOAPP-R completed on chart. The patient's medications are documented to the best of their ability. Quality of life and functional ability improved on medications. The patient is showing no aberrant behavior or evidence of diversion. The patient was advised not to use narcotic medication while operating an automobile or heavy machinery due to potential sedation or dizziness. The patient was educated to the risks associated with potential opioid dependence and addiction. Urine toxicology screens as per office protocol. Use of multimodal analgesia used prn. Follow up one month.

## 2024-03-07 ENCOUNTER — APPOINTMENT (OUTPATIENT)
Dept: ORTHOPEDIC SURGERY | Facility: CLINIC | Age: 74
End: 2024-03-07
Payer: MEDICARE

## 2024-03-07 VITALS — WEIGHT: 225 LBS | BODY MASS INDEX: 34.1 KG/M2 | HEIGHT: 68 IN

## 2024-03-07 DIAGNOSIS — M48.02 SPINAL STENOSIS, CERVICAL REGION: ICD-10-CM

## 2024-03-07 DIAGNOSIS — M40.202 UNSPECIFIED KYPHOSIS, CERVICAL REGION: ICD-10-CM

## 2024-03-07 PROCEDURE — 99203 OFFICE O/P NEW LOW 30 MIN: CPT

## 2024-03-07 NOTE — HISTORY OF PRESENT ILLNESS
[Localized] : localized [Meds] : meds [Lying in bed] : lying in bed [] : no [FreeTextEntry5] : 72 y/o M presents for NP eval of the Lt. shldr today. Pt report of gradual shldr pain for the past 8 weeks with no associated trauma. Numbness down into Lt. hand. Hydromorphone and cortisone as needed. Good ROM.

## 2024-03-07 NOTE — ASSESSMENT
[FreeTextEntry1] : The patient is a 74 yo man presenting with left shoulder pain of 2 months without trauma. The patient has pain to the superior and lateral shoulder. He denies paresthesias but has cervical spine fusion. The patient has felt weak with ADLs and has had decreased ROM. He is LHD. The patient has been taking a medrol dose pack and his pain has since decreased significantly. The patient has intermittent numbness and tingling to the left hand and ultimately most of his pain is the trapezius. The patient is able to complete ADLs and lifting to shoulder height. He has not had any treatment.  Left shoulder exam; The patient presents with no apparent distress. Neurovascularly intact. Sensation intact to left upper extremity. No scars, rashes, or abrasions. 2+ pulses to the distal radius.Tender to palpation at the superior shoulder and trapezius. Slight tenderness to anterior shoulder as well. AROM   ABD 90 ER 65 IR L1. 5-/5 RC strength. 5/5 Deltoid strength. +Sami. - Sarah.   Left shoulder xrays 3 views taken on 1/16/24  - No GHOA. No AC OA. GH joint is reduced. No fractures or loose bodies. No obvious tumors, masses, or lesions.   The patient has very good left shoulder ROM and strength. Based on his extensive c-spine history, relief with oral steroid, and location of pain I believe his neck is the likely issue. He will go for MRI to r/o and structural issues. He will also begin PT.

## 2024-03-13 ENCOUNTER — OUTPATIENT (OUTPATIENT)
Dept: OUTPATIENT SERVICES | Facility: HOSPITAL | Age: 74
LOS: 1 days | End: 2024-03-13
Payer: MEDICARE

## 2024-03-13 ENCOUNTER — RESULT REVIEW (OUTPATIENT)
Age: 74
End: 2024-03-13

## 2024-03-13 ENCOUNTER — APPOINTMENT (OUTPATIENT)
Dept: MRI IMAGING | Facility: CLINIC | Age: 74
End: 2024-03-13
Payer: MEDICARE

## 2024-03-13 DIAGNOSIS — M48.02 SPINAL STENOSIS, CERVICAL REGION: ICD-10-CM

## 2024-03-13 DIAGNOSIS — Z87.828 PERSONAL HISTORY OF OTHER (HEALED) PHYSICAL INJURY AND TRAUMA: Chronic | ICD-10-CM

## 2024-03-13 DIAGNOSIS — Z96.659 PRESENCE OF UNSPECIFIED ARTIFICIAL KNEE JOINT: Chronic | ICD-10-CM

## 2024-03-13 DIAGNOSIS — Z98.890 OTHER SPECIFIED POSTPROCEDURAL STATES: Chronic | ICD-10-CM

## 2024-03-13 DIAGNOSIS — M40.202 UNSPECIFIED KYPHOSIS, CERVICAL REGION: ICD-10-CM

## 2024-03-13 PROCEDURE — 73221 MRI JOINT UPR EXTREM W/O DYE: CPT | Mod: 26,LT,MH

## 2024-03-13 PROCEDURE — 73221 MRI JOINT UPR EXTREM W/O DYE: CPT | Mod: MH

## 2024-03-18 ENCOUNTER — APPOINTMENT (OUTPATIENT)
Dept: ORTHOPEDIC SURGERY | Facility: CLINIC | Age: 74
End: 2024-03-18
Payer: MEDICARE

## 2024-03-18 ENCOUNTER — APPOINTMENT (OUTPATIENT)
Dept: PAIN MANAGEMENT | Facility: CLINIC | Age: 74
End: 2024-03-18
Payer: MEDICARE

## 2024-03-18 VITALS — BODY MASS INDEX: 34.1 KG/M2 | HEIGHT: 68 IN | WEIGHT: 225 LBS

## 2024-03-18 DIAGNOSIS — M25.812 OTHER SPECIFIED JOINT DISORDERS, LEFT SHOULDER: ICD-10-CM

## 2024-03-18 PROCEDURE — 99213 OFFICE O/P EST LOW 20 MIN: CPT

## 2024-03-18 NOTE — DISCUSSION/SUMMARY
[Medication Risks Reviewed] : Medication risks reviewed [de-identified] : Prescriptions renewed. [place] and [place]. SOAPP-R completed on chart. The patient's medications are documented to the best of their ability. Quality of life and functional ability improved on medications. The patient is showing no aberrant behavior or evidence of diversion. The patient was advised not to use narcotic medication while operating an automobile or heavy machinery due to potential sedation or dizziness. The patient was educated to the risks associated with potential opioid dependence and addiction. Urine toxicology screens as per office protocol. Use of multimodal analgesia used prn. Follow up one month.

## 2024-03-18 NOTE — REASON FOR VISIT
[Follow-Up Visit] : a follow-up pain management visit [Home] : at home, [unfilled] , at the time of the visit. [Medical Office: ([facility])___] : at the medical office located in  [Patient] : the patient [Self] : self

## 2024-03-18 NOTE — HISTORY OF PRESENT ILLNESS
[2] : 2 [Lower back] : lower back [Burning] : burning [Frequent] : frequent [Meds] : meds [Heat] : heat [Retired] : Work status: retired [FreeTextEntry1] : Chronic back pain. Meds effective prn. Following up with Dr Medina later today. regarding shoulder and possible. neck. issues. S/P Cervical fusion 3. years ago with Dr Hill at [facility].  [] : no [de-identified] : EXTENDED ACTIVITY [de-identified] : 2022 - FUSION T11-S1 [de-identified] : MRI'S [TWNoteComboBox1] : 70%

## 2024-03-18 NOTE — HISTORY OF PRESENT ILLNESS
[2] : 2 [Localized] : localized [Meds] : meds [Lying in bed] : lying in bed [] : Post Surgical Visit: no [FreeTextEntry5] : 72 y/o M presents for FU eval of the Lt. shldr today. Pt report of gradual shldr pain for the past 8 weeks with no associated trauma. Numbness down into Lt. hand. Hydromorphone and cortisone as needed. Good ROM.  He is going over his mri results today. States he is progessing slowly with minimal pain.

## 2024-03-18 NOTE — ASSESSMENT
[FreeTextEntry1] : Comes in today for evaluation of his left shoulder.  He has had pain in the left shoulder which mostly radiates from his neck when he pushes his neck back.  He had previous cervical fusion about 2 years ago at [facility].  His shoulder itself is minimally painful.  He had a recent MRI that were we will review today.  Examination of his left upper extremity reveals normal neurovascular exam.  Examination left shoulder is full active and passive range of motion with 5 out of 5 strength of the deltoid and rotator cuff.  There is negative impingement signs.  The negative speeds test and negative McCormick sign.  There is no pain or deformity over the AC joint.  There is no instability or apprehension.  There is no redness, rashes or visible scars.  Review of the MRI of the left shoulder from [facility] shows mild bursitis with no full-thickness tearing of the rotator cuff.  The biceps tendon is intact with mild tendinitis.  There is no arthritis.  There is mild arthritic changes at the acromioclavicular joint.  Plan at this point is to have him go back to see his cervical spine surgeon and pain management with Dr. Baldwin.  I do not believe his left shoulder is involved in the pathology.  Will see him back as necessary.

## 2024-03-25 ENCOUNTER — APPOINTMENT (OUTPATIENT)
Dept: PAIN MANAGEMENT | Facility: CLINIC | Age: 74
End: 2024-03-25
Payer: MEDICARE

## 2024-03-25 PROCEDURE — 99213 OFFICE O/P EST LOW 20 MIN: CPT

## 2024-03-25 NOTE — ASSESSMENT
[FreeTextEntry1] : Interim history The patient returns with pain that has been treated adequately in the past with epidural steroid injections.  The patient's complaints are consistent with radiculopathy. Patient's ADL's increase with prior RODOLFO.   The last injection gave greater than 60% reduction of the pain but now there is a return of symptoms. The patient is requesting a subsequent epidural steroid injection to alleviate pain and improve functional ability and quality of life.  Patient is a candidate. Objective findings Since the last visit, there have been no new imaging studies. The patient has no new symptoms except the return of the their pain complaints. Motor and sensory function is unchanged. Plan Prior to any intrvention, the patient will obtain a new MRI. THe last one was greater than three years ago.Spoke to patient about epidural steroid injections. Explained risks, benefits and alternatives including but not limited to the risk of infection, bleeding, headache, syncopal episode, failure to resolve issues, allergic reaction, symptom recurrence, allergic reaction, nerve injury, and increased pain. The patient understands the risks. All questions were answered. The patient is willing to proceed.

## 2024-03-25 NOTE — HISTORY OF PRESENT ILLNESS
[Neck] : neck [Left Arm] : left arm [Right Arm] : right arm [Gradual] : gradual [8] : 8 [2] : 2 [Dull/Aching] : dull/aching [Sharp] : sharp [Tingling] : tingling [Constant] : constant [Rest] : rest [Meds] : meds [Ice] : ice [Heat] : heat [Walking/activity] : walking/activity [Standing] : standing [Part time] : Work status: part time [3] : 3 [Steroid] : Steroid [Home] : at home, [unfilled] , at the time of the visit. [Medical Office: ([facility])___] : at the medical office located in  [Verbal consent obtained from patient] : the patient, [unfilled] [] : Post Surgical Visit: no [FreeTextEntry7] : TO [facility]  [FreeTextEntry8] : NONE  [de-identified] : 03/2022 [de-identified] : LUMBAR [de-identified] : HOME EXERCISE PROGRAM 6-7X WEEK  3YRS HAD A CSPINE FUSION  [de-identified] : EPIDURAL

## 2024-03-29 ENCOUNTER — TRANSCRIPTION ENCOUNTER (OUTPATIENT)
Age: 74
End: 2024-03-29

## 2024-03-29 NOTE — ASU PATIENT PROFILE, ADULT - NSICDXPASTSURGICALHX_GEN_ALL_CORE_FT
PAST SURGICAL HISTORY:  History of back injury sx lumbar 2013 & 2014 with hardware    History of shoulder surgery right    S/P TKR (total knee replacement) left     PAST SURGICAL HISTORY:  Cervical vertebral fusion     History of back injury sx lumbar 2013 & 2014 with hardware  fusion   in 2021    History of shoulder surgery right    S/P TKR (total knee replacement) left

## 2024-04-01 ENCOUNTER — APPOINTMENT (OUTPATIENT)
Dept: ORTHOPEDIC SURGERY | Facility: HOSPITAL | Age: 74
End: 2024-04-01
Payer: MEDICARE

## 2024-04-01 ENCOUNTER — OUTPATIENT (OUTPATIENT)
Dept: OUTPATIENT SERVICES | Facility: HOSPITAL | Age: 74
LOS: 1 days | End: 2024-04-01
Payer: MEDICARE

## 2024-04-01 VITALS
HEIGHT: 68 IN | SYSTOLIC BLOOD PRESSURE: 137 MMHG | TEMPERATURE: 99 F | WEIGHT: 225.09 LBS | DIASTOLIC BLOOD PRESSURE: 90 MMHG | HEART RATE: 66 BPM | RESPIRATION RATE: 14 BRPM | OXYGEN SATURATION: 96 %

## 2024-04-01 VITALS
RESPIRATION RATE: 18 BRPM | DIASTOLIC BLOOD PRESSURE: 85 MMHG | SYSTOLIC BLOOD PRESSURE: 143 MMHG | HEART RATE: 78 BPM | OXYGEN SATURATION: 96 %

## 2024-04-01 DIAGNOSIS — Z96.659 PRESENCE OF UNSPECIFIED ARTIFICIAL KNEE JOINT: Chronic | ICD-10-CM

## 2024-04-01 DIAGNOSIS — Z98.890 OTHER SPECIFIED POSTPROCEDURAL STATES: Chronic | ICD-10-CM

## 2024-04-01 DIAGNOSIS — M54.12 RADICULOPATHY, CERVICAL REGION: ICD-10-CM

## 2024-04-01 DIAGNOSIS — Z87.828 PERSONAL HISTORY OF OTHER (HEALED) PHYSICAL INJURY AND TRAUMA: Chronic | ICD-10-CM

## 2024-04-01 DIAGNOSIS — M43.22 FUSION OF SPINE, CERVICAL REGION: Chronic | ICD-10-CM

## 2024-04-01 PROCEDURE — 62321 NJX INTERLAMINAR CRV/THRC: CPT

## 2024-04-01 RX ORDER — LOSARTAN POTASSIUM 100 MG/1
1 TABLET, FILM COATED ORAL
Qty: 0 | Refills: 0 | DISCHARGE

## 2024-04-15 ENCOUNTER — APPOINTMENT (OUTPATIENT)
Dept: PAIN MANAGEMENT | Facility: CLINIC | Age: 74
End: 2024-04-15
Payer: MEDICARE

## 2024-04-15 DIAGNOSIS — M54.16 RADICULOPATHY, LUMBAR REGION: ICD-10-CM

## 2024-04-15 PROCEDURE — 99213 OFFICE O/P EST LOW 20 MIN: CPT

## 2024-04-15 NOTE — HISTORY OF PRESENT ILLNESS
[Neck] : neck [Left Arm] : left arm [Right Arm] : right arm [Gradual] : gradual [8] : 8 [2] : 2 [Dull/Aching] : dull/aching [Sharp] : sharp [Tingling] : tingling [Constant] : constant [Rest] : rest [Meds] : meds [Ice] : ice [Heat] : heat [Walking/activity] : walking/activity [Standing] : standing [Part time] : Work status: part time [3] : 3 [Steroid] : Steroid [Home] : at home, [unfilled] , at the time of the visit. [Medical Office: ([facility])___] : at the medical office located in  [Verbal consent obtained from patient] : the patient, [unfilled] [] : Post Surgical Visit: no [FreeTextEntry7] : TO [facility]  [FreeTextEntry8] : NONE  [de-identified] : 03/2022 [de-identified] : HOME EXERCISE PROGRAM 6-7X WEEK  3YRS HAD A CSPINE FUSION  [de-identified] : LUMBAR [de-identified] : EPIDURAL

## 2024-04-23 ENCOUNTER — APPOINTMENT (OUTPATIENT)
Dept: PAIN MANAGEMENT | Facility: CLINIC | Age: 74
End: 2024-04-23
Payer: MEDICARE

## 2024-04-23 PROCEDURE — 99213 OFFICE O/P EST LOW 20 MIN: CPT

## 2024-04-23 NOTE — HISTORY OF PRESENT ILLNESS
[Neck] : neck [Left Arm] : left arm [Right Arm] : right arm [Gradual] : gradual [8] : 8 [2] : 2 [Dull/Aching] : dull/aching [Sharp] : sharp [Tingling] : tingling [Constant] : constant [Rest] : rest [Meds] : meds [Ice] : ice [Heat] : heat [Walking/activity] : walking/activity [Standing] : standing [Part time] : Work status: part time [3] : 3 [Steroid] : Steroid [] : Post Surgical Visit: no [FreeTextEntry7] : TO [facility] ARM  [FreeTextEntry8] : NONE  [de-identified] : 03/2022 [de-identified] : HOME EXERCISE PROGRAM 6-7X WEEK  3YRS HAD A CSPINE FUSION  [de-identified] : LUMBAR [de-identified] : EPIDURAL

## 2024-04-23 NOTE — ASSESSMENT
[FreeTextEntry1] : Interim history The patient returns with pain that has been treated adequately in the past with epidural steroid injections.  The patient's complaints are consistent with radiculopathy. Patient's ADL's increase with prior RODOLFO.   The last injection gave greater than 60% reduction of the pain but now there is a return of symptoms. The patient is requesting a subsequent epidural steroid injection to alleviate pain and improve functional ability and quality of life.  Patient is a candidate.  The patient has talked to a surgeon who would like the patient to try another epidural at a slightly different location. Objective findings Since the last visit, there have been no new imaging studies. The patient has no new symptoms except the return of the their pain complaints. Motor and sensory function is unchanged. Plan Spoke to patient about epidural steroid injections.  We will be proceeding with a CT of 6 7 epidural steroid injection.  This is a distinctly different level than what was performed before.  Explained risks, benefits and alternatives including but not limited to the risk of infection, bleeding, headache, syncopal episode, failure to resolve issues, allergic reaction, symptom recurrence, allergic reaction, nerve injury, and increased pain. The patient understands the risks. All questions were answered. The patient is willing to proceed.  This note was generated by using Dragon medical dictation software.  A reasonable effort has been made for proofreading its contents, but typos may still remain.  If there are any questions or points of clarification needed, please notify my office.

## 2024-04-25 ENCOUNTER — TRANSCRIPTION ENCOUNTER (OUTPATIENT)
Age: 74
End: 2024-04-25

## 2024-04-25 NOTE — ASU PATIENT PROFILE, ADULT - NSICDXPASTSURGICALHX_GEN_ALL_CORE_FT
PAST SURGICAL HISTORY:  Cervical vertebral fusion     History of back injury sx lumbar 2013 & 2014 with hardware  fusion   in 2021    History of shoulder surgery right    S/P TKR (total knee replacement) left

## 2024-04-25 NOTE — ASU PATIENT PROFILE, ADULT - NSICDXPASTMEDICALHX_GEN_ALL_CORE_FT
PAST MEDICAL HISTORY:  Depression     HLD (hyperlipidemia)     HTN (hypertension)     KRISTINA (obstructive sleep apnea) no cpap    Seasonal affective disorder      PAST MEDICAL HISTORY:  HLD (hyperlipidemia)     HTN (hypertension)     KRISTINA (obstructive sleep apnea) no cpap    Seasonal affective disorder

## 2024-04-29 ENCOUNTER — OUTPATIENT (OUTPATIENT)
Dept: OUTPATIENT SERVICES | Facility: HOSPITAL | Age: 74
LOS: 1 days | End: 2024-04-29
Payer: MEDICARE

## 2024-04-29 ENCOUNTER — APPOINTMENT (OUTPATIENT)
Dept: ORTHOPEDIC SURGERY | Facility: HOSPITAL | Age: 74
End: 2024-04-29
Payer: MEDICARE

## 2024-04-29 VITALS
SYSTOLIC BLOOD PRESSURE: 121 MMHG | OXYGEN SATURATION: 96 % | DIASTOLIC BLOOD PRESSURE: 70 MMHG | RESPIRATION RATE: 14 BRPM | HEART RATE: 79 BPM

## 2024-04-29 VITALS
RESPIRATION RATE: 13 BRPM | WEIGHT: 220.02 LBS | TEMPERATURE: 98 F | OXYGEN SATURATION: 95 % | HEIGHT: 68 IN | SYSTOLIC BLOOD PRESSURE: 129 MMHG | DIASTOLIC BLOOD PRESSURE: 64 MMHG | HEART RATE: 85 BPM

## 2024-04-29 DIAGNOSIS — Z87.828 PERSONAL HISTORY OF OTHER (HEALED) PHYSICAL INJURY AND TRAUMA: Chronic | ICD-10-CM

## 2024-04-29 DIAGNOSIS — Z98.890 OTHER SPECIFIED POSTPROCEDURAL STATES: Chronic | ICD-10-CM

## 2024-04-29 DIAGNOSIS — M43.22 FUSION OF SPINE, CERVICAL REGION: Chronic | ICD-10-CM

## 2024-04-29 DIAGNOSIS — M54.12 RADICULOPATHY, CERVICAL REGION: ICD-10-CM

## 2024-04-29 DIAGNOSIS — Z96.659 PRESENCE OF UNSPECIFIED ARTIFICIAL KNEE JOINT: Chronic | ICD-10-CM

## 2024-04-29 PROCEDURE — 62321 NJX INTERLAMINAR CRV/THRC: CPT

## 2024-04-29 RX ORDER — LATANOPROST 0.05 MG/ML
1 SOLUTION/ DROPS OPHTHALMIC; TOPICAL
Refills: 0 | DISCHARGE

## 2024-04-29 RX ORDER — HYDROMORPHONE HYDROCHLORIDE 2 MG/ML
1 INJECTION INTRAMUSCULAR; INTRAVENOUS; SUBCUTANEOUS
Qty: 0 | Refills: 0 | DISCHARGE

## 2024-04-29 RX ORDER — BUPROPION HYDROCHLORIDE 150 MG/1
1 TABLET, EXTENDED RELEASE ORAL
Qty: 0 | Refills: 0 | DISCHARGE

## 2024-04-29 RX ORDER — ROSUVASTATIN CALCIUM 5 MG/1
1 TABLET ORAL
Refills: 0 | DISCHARGE

## 2024-04-29 RX ORDER — HYDROCHLOROTHIAZIDE 25 MG
1 TABLET ORAL
Qty: 0 | Refills: 0 | DISCHARGE

## 2024-04-29 RX ORDER — ROSUVASTATIN CALCIUM 5 MG/1
1 TABLET ORAL
Qty: 0 | Refills: 0 | DISCHARGE

## 2024-04-29 RX ORDER — IBUPROFEN 200 MG
1 TABLET ORAL
Qty: 0 | Refills: 0 | DISCHARGE

## 2024-04-29 RX ORDER — ASPIRIN/CALCIUM CARB/MAGNESIUM 324 MG
1 TABLET ORAL
Qty: 0 | Refills: 0 | DISCHARGE

## 2024-04-29 RX ORDER — ALPRAZOLAM 0.25 MG
1 TABLET ORAL
Qty: 0 | Refills: 0 | DISCHARGE

## 2024-04-29 RX ORDER — LOSARTAN POTASSIUM 100 MG/1
1 TABLET, FILM COATED ORAL
Refills: 0 | DISCHARGE

## 2024-04-29 RX ORDER — SEMAGLUTIDE 0.68 MG/ML
0.25 INJECTION, SOLUTION SUBCUTANEOUS
Refills: 0 | DISCHARGE

## 2024-04-29 NOTE — ASU PREOP CHECKLIST - HEART RATE (BEATS/MIN)
7/1/2020  Spoke to Keily for CCM. Updates to patient care team/ comments: None  Patient reported changes in medications: None  Med Adherence  Comment: Reviewed medications with patient taking as prescribed.     Health Maintenance:   LDL Control due on BP while on the phone with me and it was 136/83 and pulse was 76. I suggested to schedule appointment with JUAREZ Smith and patient agrees. I scheduled appointment for 7/14/20. Sent message to JUAREZ Smith regarding elevated BP readings.     Previ 85

## 2024-05-13 ENCOUNTER — APPOINTMENT (OUTPATIENT)
Dept: PAIN MANAGEMENT | Facility: CLINIC | Age: 74
End: 2024-05-13
Payer: MEDICARE

## 2024-05-13 DIAGNOSIS — M54.12 RADICULOPATHY, CERVICAL REGION: ICD-10-CM

## 2024-05-13 PROCEDURE — 99213 OFFICE O/P EST LOW 20 MIN: CPT

## 2024-05-13 RX ORDER — HYDROMORPHONE HYDROCHLORIDE 4 MG/1
4 TABLET ORAL
Qty: 60 | Refills: 0 | Status: ACTIVE | COMMUNITY
Start: 2023-03-20 | End: 1900-01-01

## 2024-05-13 NOTE — REASON FOR VISIT
[Follow-Up Visit] : a follow-up pain management visit [Home] : at home, [unfilled] , at the time of the visit. [Medical Office: ([facility])___] : at the medical office located in  [Patient] : the patient [Self] : self

## 2024-05-13 NOTE — HISTORY OF PRESENT ILLNESS
[Neck] : neck [Left Arm] : left arm [Right Arm] : right arm [Gradual] : gradual [8] : 8 [2] : 2 [Dull/Aching] : dull/aching [Sharp] : sharp [Tingling] : tingling [Constant] : constant [Rest] : rest [Meds] : meds [Ice] : ice [Heat] : heat [Walking/activity] : walking/activity [Standing] : standing [Part time] : Work status: part time [3] : 3 [Steroid] : Steroid [FreeTextEntry1] : S/P YONAS at C6/7 4- and states over 50 % improvement. He is able to play golf and pain managed on less narcotic pain medication.  [] : Post Surgical Visit: no [FreeTextEntry7] : TO [facility]  [FreeTextEntry8] : NONE  [de-identified] : 03/2022 [de-identified] : HOME EXERCISE PROGRAM 6-7X WEEK  3YRS HAD A CSPINE FUSION  [de-identified] : LUMBAR [de-identified] : EPIDURAL

## 2024-05-13 NOTE — DISCUSSION/SUMMARY
[Medication Risks Reviewed] : Medication risks reviewed [de-identified] : Prescriptions renewed. [place] and [place]. SOAPP-R completed on chart. The patient's medications are documented to the best of their ability. Quality of life and functional ability improved on medications. The patient is showing no aberrant behavior or evidence of diversion. The patient was advised not to use narcotic medication while operating an automobile or heavy machinery due to potential sedation or dizziness. The patient was educated to the risks associated with potential opioid dependence and addiction. Urine toxicology screens as per office protocol. Use of multimodal analgesia used prn. Follow up one month.

## 2024-05-13 NOTE — ASSESSMENT
[FreeTextEntry1] : Prescriptions renewed. Opioid agreement/obtained on chart [place]  reviewed and appropriate. SOAPP-R completed on chart. The patient's medications are documented to the best of their ability. Quality of life and functional ability improved on medications. The patient is showing no aberrant behavior or evidence of diversion. The patient was advised not to use narcotic medication while operating an automobile or heavy machinery due to potential sedation or dizziness. The patient was educated to the risks associated with potential opioid dependence and addiction. Urine toxicology screens as per office protocol. Use of multimodal analgesia used prn. Follow up one month.

## 2024-07-16 ENCOUNTER — APPOINTMENT (OUTPATIENT)
Dept: PAIN MANAGEMENT | Facility: CLINIC | Age: 74
End: 2024-07-16
Payer: MEDICARE

## 2024-07-16 DIAGNOSIS — M54.12 RADICULOPATHY, CERVICAL REGION: ICD-10-CM

## 2024-07-16 PROCEDURE — 99214 OFFICE O/P EST MOD 30 MIN: CPT

## 2024-08-02 ENCOUNTER — TRANSCRIPTION ENCOUNTER (OUTPATIENT)
Age: 74
End: 2024-08-02

## 2024-08-02 NOTE — ASU PATIENT PROFILE, ADULT - NSICDXPASTMEDICALHX_GEN_ALL_CORE_FT
PAST MEDICAL HISTORY:  HLD (hyperlipidemia)     HTN (hypertension)     KRISTINA (obstructive sleep apnea) no cpap    Seasonal affective disorder

## 2024-08-02 NOTE — ASU PATIENT PROFILE, ADULT - FALL HARM RISK - UNIVERSAL INTERVENTIONS
Bed in lowest position, wheels locked, appropriate side rails in place/Call bell, personal items and telephone in reach/Instruct patient to call for assistance before getting out of bed or chair/Non-slip footwear when patient is out of bed/[place] to call system/Physically safe environment - no spills, clutter or unnecessary equipment/Purposeful Proactive Rounding/Room/bathroom lighting operational, light cord in reach

## 2024-08-02 NOTE — ASU DISCHARGE PLAN (ADULT/PEDIATRIC) - NS MD DC FALL RISK RISK
For information on Fall & Injury Prevention, visit: [email or web address] OR  [email or web address] OR  https://www.cdc.gov/steadi/patient.html

## 2024-08-05 ENCOUNTER — OUTPATIENT (OUTPATIENT)
Dept: OUTPATIENT SERVICES | Facility: HOSPITAL | Age: 74
LOS: 1 days | End: 2024-08-05
Payer: MEDICARE

## 2024-08-05 ENCOUNTER — APPOINTMENT (OUTPATIENT)
Dept: ORTHOPEDIC SURGERY | Facility: HOSPITAL | Age: 74
End: 2024-08-05

## 2024-08-05 VITALS
SYSTOLIC BLOOD PRESSURE: 120 MMHG | HEART RATE: 72 BPM | RESPIRATION RATE: 11 BRPM | DIASTOLIC BLOOD PRESSURE: 70 MMHG | OXYGEN SATURATION: 99 %

## 2024-08-05 VITALS
SYSTOLIC BLOOD PRESSURE: 147 MMHG | HEIGHT: 68 IN | HEART RATE: 74 BPM | RESPIRATION RATE: 20 BRPM | TEMPERATURE: 98 F | WEIGHT: 199.96 LBS | OXYGEN SATURATION: 98 % | DIASTOLIC BLOOD PRESSURE: 75 MMHG

## 2024-08-05 DIAGNOSIS — Z87.828 PERSONAL HISTORY OF OTHER (HEALED) PHYSICAL INJURY AND TRAUMA: Chronic | ICD-10-CM

## 2024-08-05 DIAGNOSIS — M43.22 FUSION OF SPINE, CERVICAL REGION: Chronic | ICD-10-CM

## 2024-08-05 DIAGNOSIS — Z98.890 OTHER SPECIFIED POSTPROCEDURAL STATES: Chronic | ICD-10-CM

## 2024-08-05 DIAGNOSIS — M54.12 RADICULOPATHY, CERVICAL REGION: ICD-10-CM

## 2024-08-05 DIAGNOSIS — Z96.659 PRESENCE OF UNSPECIFIED ARTIFICIAL KNEE JOINT: Chronic | ICD-10-CM

## 2024-08-05 PROCEDURE — 62321 NJX INTERLAMINAR CRV/THRC: CPT

## 2024-09-18 ENCOUNTER — APPOINTMENT (OUTPATIENT)
Dept: PAIN MANAGEMENT | Facility: CLINIC | Age: 74
End: 2024-09-18
Payer: MEDICARE

## 2024-09-18 DIAGNOSIS — M54.12 RADICULOPATHY, CERVICAL REGION: ICD-10-CM

## 2024-09-18 PROCEDURE — 99213 OFFICE O/P EST LOW 20 MIN: CPT

## 2024-09-18 NOTE — HISTORY OF PRESENT ILLNESS
[Neck] : neck [Left Arm] : left arm [Right Arm] : right arm [Gradual] : gradual [8] : 8 [2] : 2 [Dull/Aching] : dull/aching [Sharp] : sharp [Tingling] : tingling [Constant] : constant [Rest] : rest [Meds] : meds [Ice] : ice [Heat] : heat [Walking/activity] : walking/activity [Standing] : standing [Part time] : Work status: part time [FreeTextEntry1] : RAFIA KWOKBLAINE IS FOLLOWING UP FOR PAIN MED REFILL, PT ADL'S INCREASE WITH MEDICATION PT IS MORE ACTIVE IN GENERAL AND HAS IMRPOVED QUALITY OF LIFE. PT IS ABLE TO COMPLETE ALL TYPE OF ADL     LAST UDS:2.20.2024  PAIN LEVEL: [] : Post Surgical Visit: no [FreeTextEntry7] : TO [facility] ARM  [FreeTextEntry8] : NONE  [de-identified] : 03/2022 [de-identified] : HOME EXERCISE PROGRAM 6-7X WEEK  3YRS HAD A CSPINE FUSION

## 2024-10-11 ENCOUNTER — OUTPATIENT (OUTPATIENT)
Dept: OUTPATIENT SERVICES | Facility: HOSPITAL | Age: 74
LOS: 1 days | End: 2024-10-11
Payer: MEDICARE

## 2024-10-11 VITALS
HEIGHT: 68.11 IN | DIASTOLIC BLOOD PRESSURE: 70 MMHG | HEART RATE: 64 BPM | TEMPERATURE: 98 F | OXYGEN SATURATION: 96 % | RESPIRATION RATE: 16 BRPM | SYSTOLIC BLOOD PRESSURE: 129 MMHG | WEIGHT: 196.21 LBS

## 2024-10-11 DIAGNOSIS — Z87.828 PERSONAL HISTORY OF OTHER (HEALED) PHYSICAL INJURY AND TRAUMA: Chronic | ICD-10-CM

## 2024-10-11 DIAGNOSIS — Z98.1 ARTHRODESIS STATUS: Chronic | ICD-10-CM

## 2024-10-11 DIAGNOSIS — Z98.890 OTHER SPECIFIED POSTPROCEDURAL STATES: Chronic | ICD-10-CM

## 2024-10-11 DIAGNOSIS — M43.22 FUSION OF SPINE, CERVICAL REGION: Chronic | ICD-10-CM

## 2024-10-11 DIAGNOSIS — Z96.659 PRESENCE OF UNSPECIFIED ARTIFICIAL KNEE JOINT: Chronic | ICD-10-CM

## 2024-10-11 DIAGNOSIS — Z96.652 PRESENCE OF LEFT ARTIFICIAL KNEE JOINT: Chronic | ICD-10-CM

## 2024-10-11 DIAGNOSIS — Z01.818 ENCOUNTER FOR OTHER PREPROCEDURAL EXAMINATION: ICD-10-CM

## 2024-10-11 LAB
ANION GAP SERPL CALC-SCNC: 4 MMOL/L — LOW (ref 5–17)
BASOPHILS # BLD AUTO: 0.02 K/UL — SIGNIFICANT CHANGE UP (ref 0–0.2)
BASOPHILS NFR BLD AUTO: 0.3 % — SIGNIFICANT CHANGE UP (ref 0–2)
BUN SERPL-MCNC: 28 MG/DL — HIGH (ref 7–23)
CALCIUM SERPL-MCNC: 9.2 MG/DL — SIGNIFICANT CHANGE UP (ref 8.5–10.1)
CHLORIDE SERPL-SCNC: 106 MMOL/L — SIGNIFICANT CHANGE UP (ref 96–108)
CO2 SERPL-SCNC: 26 MMOL/L — SIGNIFICANT CHANGE UP (ref 22–31)
CREAT SERPL-MCNC: 0.74 MG/DL — SIGNIFICANT CHANGE UP (ref 0.5–1.3)
EGFR: 96 ML/MIN/1.73M2 — SIGNIFICANT CHANGE UP
EOSINOPHIL # BLD AUTO: 0.04 K/UL — SIGNIFICANT CHANGE UP (ref 0–0.5)
EOSINOPHIL NFR BLD AUTO: 0.6 % — SIGNIFICANT CHANGE UP (ref 0–6)
GLUCOSE SERPL-MCNC: 92 MG/DL — SIGNIFICANT CHANGE UP (ref 70–99)
HCT VFR BLD CALC: 40.1 % — SIGNIFICANT CHANGE UP (ref 39–50)
HGB BLD-MCNC: 13.6 G/DL — SIGNIFICANT CHANGE UP (ref 13–17)
IMM GRANULOCYTES NFR BLD AUTO: 0.4 % — SIGNIFICANT CHANGE UP (ref 0–0.9)
LYMPHOCYTES # BLD AUTO: 1.49 K/UL — SIGNIFICANT CHANGE UP (ref 1–3.3)
LYMPHOCYTES # BLD AUTO: 21.4 % — SIGNIFICANT CHANGE UP (ref 13–44)
MCHC RBC-ENTMCNC: 32.4 PG — SIGNIFICANT CHANGE UP (ref 27–34)
MCHC RBC-ENTMCNC: 33.9 GM/DL — SIGNIFICANT CHANGE UP (ref 32–36)
MCV RBC AUTO: 95.5 FL — SIGNIFICANT CHANGE UP (ref 80–100)
MONOCYTES # BLD AUTO: 0.57 K/UL — SIGNIFICANT CHANGE UP (ref 0–0.9)
MONOCYTES NFR BLD AUTO: 8.2 % — SIGNIFICANT CHANGE UP (ref 2–14)
NEUTROPHILS # BLD AUTO: 4.82 K/UL — SIGNIFICANT CHANGE UP (ref 1.8–7.4)
NEUTROPHILS NFR BLD AUTO: 69.1 % — SIGNIFICANT CHANGE UP (ref 43–77)
PLATELET # BLD AUTO: 196 K/UL — SIGNIFICANT CHANGE UP (ref 150–400)
POTASSIUM SERPL-MCNC: 4.1 MMOL/L — SIGNIFICANT CHANGE UP (ref 3.5–5.3)
POTASSIUM SERPL-SCNC: 4.1 MMOL/L — SIGNIFICANT CHANGE UP (ref 3.5–5.3)
RBC # BLD: 4.2 M/UL — SIGNIFICANT CHANGE UP (ref 4.2–5.8)
RBC # FLD: 13.2 % — SIGNIFICANT CHANGE UP (ref 10.3–14.5)
SODIUM SERPL-SCNC: 136 MMOL/L — SIGNIFICANT CHANGE UP (ref 135–145)
WBC # BLD: 6.97 K/UL — SIGNIFICANT CHANGE UP (ref 3.8–10.5)
WBC # FLD AUTO: 6.97 K/UL — SIGNIFICANT CHANGE UP (ref 3.8–10.5)

## 2024-10-11 PROCEDURE — 80048 BASIC METABOLIC PNL TOTAL CA: CPT

## 2024-10-11 PROCEDURE — 99214 OFFICE O/P EST MOD 30 MIN: CPT | Mod: 25

## 2024-10-11 PROCEDURE — 87640 STAPH A DNA AMP PROBE: CPT

## 2024-10-11 PROCEDURE — 85025 COMPLETE CBC W/AUTO DIFF WBC: CPT

## 2024-10-11 PROCEDURE — 36415 COLL VENOUS BLD VENIPUNCTURE: CPT

## 2024-10-11 PROCEDURE — 87641 MR-STAPH DNA AMP PROBE: CPT

## 2024-10-11 RX ORDER — ALPRAZOLAM 0.5 MG/1
1 TABLET ORAL
Refills: 0 | DISCHARGE

## 2024-10-11 RX ORDER — TIMOLOL MALEATE 0.5 %
1 DROPS OPHTHALMIC (EYE)
Refills: 0 | DISCHARGE

## 2024-10-11 RX ORDER — LOSARTAN POTASSIUM 100 MG/1
1 TABLET, FILM COATED ORAL
Refills: 0 | DISCHARGE

## 2024-10-11 NOTE — H&P PST ADULT - NSICDXPASTMEDICALHX_GEN_ALL_CORE_FT
PAST MEDICAL HISTORY:  Anxiety     Cervical spondylosis     DDD (degenerative disc disease), lumbar     Glaucoma, right eye     HLD (hyperlipidemia)     HTN (hypertension)     Macular pucker, right     KRISTINA (obstructive sleep apnea) no cpap    Seasonal affective disorder

## 2024-10-11 NOTE — H&P PST ADULT - NSICDXPASTSURGICALHX_GEN_ALL_CORE_FT
PAST SURGICAL HISTORY:  Cervical vertebral fusion     H/O total knee replacement, left     History of back injury sx lumbar 2013 & 2014 with hardware  fusion   in 2021    History of eye surgery     History of shoulder surgery     S/P lumbar spinal fusion

## 2024-10-11 NOTE — H&P PST ADULT - NSICDXFAMILYHX_GEN_ALL_CORE_FT
FAMILY HISTORY:  Family history of early CAD    Father  Still living? No  Family history of CHF (congestive heart failure), Age at diagnosis: Age Unknown  Family history of CVA, Age at diagnosis: Age Unknown  FH: CAD (coronary artery disease), Age at diagnosis: Age Unknown    Mother  Still living? No  Family hx of lung cancer, Age at diagnosis: Age Unknown    Sibling  Still living? Yes, Estimated age: 71-80  FH: CAD (coronary artery disease), Age at diagnosis: Age Unknown

## 2024-10-11 NOTE — H&P PST ADULT - HISTORY OF PRESENT ILLNESS
74 years old male with cervical spondylosis.  History of cervial and lumbar surgery in the past. Reports constant pain to left side of neck radiating to left shoulder. Numbness and tingling to left hand. Reports decrease grasping and strength to left hand.  Planned left facet block, C 2-3.

## 2024-10-11 NOTE — H&P PST ADULT - ASSESSMENT
74 years old male present to PST prior to left facet block C2-3 with fluoroscopy with Dr. Staples.     Patient denies signs and symptoms of Covid 19 such as shortness of breath/ difficulty breathing, fever/chills, cough, muscle /body ache, headache, loss of taste or smell.    Plan   1. Education and Instructions given to patient regarding upcoming surgery  2. NPO as per ASU  3. Take the following medications with sips of water on the day of procedure: Wellbutrin. Use Timolol. May take Xanax if needed.   4. Use E-Z sponge as directed  5. Use Mupirocin as directed.  6. Drink a quart of extra  fluids the day before your surgery.  7. Medical and Cardiac optimization for surgery with Dr. Sinha  8. CBC, BMP,  MRSA done in [facility] and sent to lab  9. EKG done in [facility]  10. Do not take NSAIDS, Aspirin, Herbal supplements, Multivitamins, Vitamin E or Fish oil 7 days prior to surgery

## 2024-10-11 NOTE — H&P PST ADULT - RX
Denies Sleep apnea since recent weight loss of 25 pounds(Used GLP-1 , Semiglutide. Reports he no longer take this medication)

## 2024-10-12 DIAGNOSIS — Z01.818 ENCOUNTER FOR OTHER PREPROCEDURAL EXAMINATION: ICD-10-CM

## 2024-10-12 LAB
MRSA PCR RESULT.: SIGNIFICANT CHANGE UP
S AUREUS DNA NOSE QL NAA+PROBE: SIGNIFICANT CHANGE UP

## 2024-10-25 PROBLEM — H35.371 PUCKERING OF MACULA, RIGHT EYE: Chronic | Status: ACTIVE | Noted: 2024-10-11

## 2024-10-25 PROBLEM — M47.812 SPONDYLOSIS WITHOUT MYELOPATHY OR RADICULOPATHY, CERVICAL REGION: Chronic | Status: ACTIVE | Noted: 2024-10-11

## 2024-10-25 PROBLEM — M51.369 OTHER INTERVERTEBRAL DISC DEGENERATION, LUMBAR REGION WITHOUT MENTION OF LUMBAR BACK PAIN OR LOWER EXTREMITY PAIN: Chronic | Status: ACTIVE | Noted: 2024-10-11

## 2024-10-25 PROBLEM — H40.9 UNSPECIFIED GLAUCOMA: Chronic | Status: ACTIVE | Noted: 2024-10-11

## 2024-11-08 ENCOUNTER — TRANSCRIPTION ENCOUNTER (OUTPATIENT)
Age: 74
End: 2024-11-08

## 2024-11-08 ENCOUNTER — OUTPATIENT (OUTPATIENT)
Dept: INPATIENT UNIT | Facility: HOSPITAL | Age: 74
LOS: 1 days | Discharge: HOME | End: 2024-11-08
Payer: MEDICARE

## 2024-11-08 VITALS
TEMPERATURE: 97 F | RESPIRATION RATE: 16 BRPM | HEART RATE: 63 BPM | OXYGEN SATURATION: 96 % | DIASTOLIC BLOOD PRESSURE: 57 MMHG | SYSTOLIC BLOOD PRESSURE: 107 MMHG

## 2024-11-08 VITALS
DIASTOLIC BLOOD PRESSURE: 76 MMHG | TEMPERATURE: 97 F | HEIGHT: 68 IN | RESPIRATION RATE: 16 BRPM | OXYGEN SATURATION: 98 % | WEIGHT: 196.21 LBS | SYSTOLIC BLOOD PRESSURE: 137 MMHG | HEART RATE: 70 BPM

## 2024-11-08 DIAGNOSIS — Z98.890 OTHER SPECIFIED POSTPROCEDURAL STATES: Chronic | ICD-10-CM

## 2024-11-08 DIAGNOSIS — Z96.652 PRESENCE OF LEFT ARTIFICIAL KNEE JOINT: Chronic | ICD-10-CM

## 2024-11-08 DIAGNOSIS — M43.12 SPONDYLOLISTHESIS, CERVICAL REGION: ICD-10-CM

## 2024-11-08 DIAGNOSIS — M43.22 FUSION OF SPINE, CERVICAL REGION: Chronic | ICD-10-CM

## 2024-11-08 DIAGNOSIS — Z87.828 PERSONAL HISTORY OF OTHER (HEALED) PHYSICAL INJURY AND TRAUMA: Chronic | ICD-10-CM

## 2024-11-08 DIAGNOSIS — Z98.1 ARTHRODESIS STATUS: Chronic | ICD-10-CM

## 2024-11-08 PROCEDURE — 76000 FLUOROSCOPY <1 HR PHYS/QHP: CPT

## 2024-11-08 NOTE — ASU DISCHARGE PLAN (ADULT/PEDIATRIC) - CARE PROVIDER_API CALL
Efren Staples  Pain Medicine  [address] [place] [address], [address]  [place] [address]  Phone: [phone]  Fax: [phone]  Follow Up Time: 2 weeks

## 2024-11-08 NOTE — ASU DISCHARGE PLAN (ADULT/PEDIATRIC) - DISCHARGE TO
----- Message from SARA Ramos sent at 3/9/2023  1:02 PM CST -----  Please call and discuss the need for IV iron replacement    Please build supportive plan for Venofer for a total of 1000 mg IV, given over 3 doses.   Please contact patient once insurance approval has been obtained and appointments have been arranged Home

## 2024-11-08 NOTE — BRIEF OPERATIVE NOTE - NSICDXBRIEFPREOP_GEN_ALL_CORE_FT
PRE-OP DIAGNOSIS:  Cervical spondylosis with myelopathy 08-Nov-2024 08:34:45  Efren Staples  Cervical facet syndrome 08-Nov-2024 08:35:03  Efren Staples

## 2024-11-08 NOTE — ASU DISCHARGE PLAN (ADULT/PEDIATRIC) - FINANCIAL ASSISTANCE
[facility] provides services at a reduced cost to those who are determined to be eligible through [facility]’s financial assistance program. Information regarding [facility]’s financial assistance program can be found by going to [email or web address] or by calling [phone].

## 2024-11-08 NOTE — ASU PATIENT PROFILE, ADULT - VISION (WITH CORRECTIVE LENSES IF THE PATIENT USUALLY WEARS THEM):
Alert and oriented, no focal deficits, no motor or sensory deficits.
Normal vision: sees adequately in most situations; can see medication labels, newsprint

## 2024-11-08 NOTE — ASU PREOP CHECKLIST - CHLOROHEXIDINE WASH 1
Occupational Therapy  Facility/Department: [facility] [facility]), Acute respiratory failure ([address] Age-related nuclear cataract of both eyes, Atelectasis, Atrial fibrillation ([address] CAD (coronary artery disease), Cancer ([address] CHF (congestive heart failure) ([address] [employer], rolling, Electric scooter, Imelda Laughter, [facility])  Transfer Assistance: Independent  Active : Gilberto Sánchez, OT 06-Nov-2024 06:45

## 2024-11-08 NOTE — ASU DISCHARGE PLAN (ADULT/PEDIATRIC) - ACTIVITY LEVEL
[FreeTextEntry1] : 7/31: Labs, exam and ekg wnl, pt. is medically optimized for procedure. 
No exercise

## 2024-11-08 NOTE — ASU PATIENT PROFILE, ADULT - FALL HARM RISK - HARM RISK INTERVENTIONS

## 2024-11-08 NOTE — ASU DISCHARGE PLAN (ADULT/PEDIATRIC) - NS MD DC FALL RISK RISK
For information on Fall & Injury Prevention, visit: [email or web address] OR  [email or web address] OR  https://www.cdc.gov/steadi/patient.html

## 2024-11-08 NOTE — ASU DISCHARGE PLAN (ADULT/PEDIATRIC) - ASU DC REMOVE DRESSINGFT
Patient has a current diagnosis of hypertensive urgency (without evidence of end organ damage) which is uncontrolled.  Latest blood pressure and vitals reviewed-   Temp:  [98.2 °F (36.8 °C)-99.3 °F (37.4 °C)]   Pulse:  []   Resp:  [14-36]   BP: (175-242)/()   SpO2:  [87 %-99 %] .   Patient currently on IV antihypertensives.   Home meds for hypertension were reviewed and noted below.   Hypertension Medications             amLODIPine (NORVASC) 10 MG tablet Take 1 tablet (10 mg total) by mouth once daily.    carvediloL (COREG) 25 MG tablet Take 1 tablet (25 mg total) by mouth 2 (two) times daily.    hydrALAZINE (APRESOLINE) 100 MG tablet Take 1 tablet (100 mg total) by mouth every 8 (eight) hours.    irbesartan (AVAPRO) 300 MG tablet Take 1 tablet (300 mg total) by mouth once daily.    propranoloL (INDERAL) 10 MG tablet Take 10 mg by mouth once daily.          Medication adjustment for hospital antihypertensives is as follows-   Attempted to control BP with po meds and control pain with narcotics however persistently elevated BP and inability to keep po meds down ultimately transferred pt to the ICU for Cardene gtt. Discontinued all po anti-HTN for now.   Goal MAP reduction of 25-30% in first few hours with goal to avoid rapidly correcting BP  - could consider nitroprusside for short term BP management  - likely significant component of pain related    Will aim for controlled BP reduction by medications noted above. Monitor and mitigate end organ damage as indicated.   4

## 2024-11-08 NOTE — BRIEF OPERATIVE NOTE - NSICDXBRIEFPOSTOP_GEN_ALL_CORE_FT
POST-OP DIAGNOSIS:  Cervical spondylosis with myelopathy 08-Nov-2024 08:35:15  Efren Staples  Cervical facet joint syndrome 08-Nov-2024 08:35:26  Efren Staples

## 2024-11-15 ENCOUNTER — APPOINTMENT (OUTPATIENT)
Dept: PAIN MANAGEMENT | Facility: CLINIC | Age: 74
End: 2024-11-15
Payer: MEDICARE

## 2024-11-15 DIAGNOSIS — I10 ESSENTIAL (PRIMARY) HYPERTENSION: ICD-10-CM

## 2024-11-15 DIAGNOSIS — Z98.1 ARTHRODESIS STATUS: ICD-10-CM

## 2024-11-15 DIAGNOSIS — M47.892 OTHER SPONDYLOSIS, CERVICAL REGION: ICD-10-CM

## 2024-11-15 DIAGNOSIS — E78.00 PURE HYPERCHOLESTEROLEMIA, UNSPECIFIED: ICD-10-CM

## 2024-11-15 DIAGNOSIS — Z87.891 PERSONAL HISTORY OF NICOTINE DEPENDENCE: ICD-10-CM

## 2024-11-15 DIAGNOSIS — M54.12 RADICULOPATHY, CERVICAL REGION: ICD-10-CM

## 2024-11-15 DIAGNOSIS — M43.12 SPONDYLOLISTHESIS, CERVICAL REGION: ICD-10-CM

## 2024-11-15 PROCEDURE — 99212 OFFICE O/P EST SF 10 MIN: CPT

## 2024-11-20 ENCOUNTER — EMERGENCY (EMERGENCY)
Facility: HOSPITAL | Age: 74
LOS: 0 days | Discharge: HOME | End: 2024-11-20
Attending: EMERGENCY MEDICINE
Payer: MEDICARE

## 2024-11-20 VITALS
SYSTOLIC BLOOD PRESSURE: 124 MMHG | RESPIRATION RATE: 17 BRPM | DIASTOLIC BLOOD PRESSURE: 73 MMHG | HEIGHT: 68 IN | HEART RATE: 79 BPM | OXYGEN SATURATION: 98 % | TEMPERATURE: 97 F

## 2024-11-20 VITALS
RESPIRATION RATE: 18 BRPM | HEART RATE: 68 BPM | TEMPERATURE: 97 F | SYSTOLIC BLOOD PRESSURE: 135 MMHG | DIASTOLIC BLOOD PRESSURE: 83 MMHG | OXYGEN SATURATION: 97 %

## 2024-11-20 DIAGNOSIS — Z96.652 PRESENCE OF LEFT ARTIFICIAL KNEE JOINT: Chronic | ICD-10-CM

## 2024-11-20 DIAGNOSIS — Z87.828 PERSONAL HISTORY OF OTHER (HEALED) PHYSICAL INJURY AND TRAUMA: Chronic | ICD-10-CM

## 2024-11-20 DIAGNOSIS — K59.00 CONSTIPATION, UNSPECIFIED: ICD-10-CM

## 2024-11-20 DIAGNOSIS — M19.90 UNSPECIFIED OSTEOARTHRITIS, UNSPECIFIED SITE: ICD-10-CM

## 2024-11-20 DIAGNOSIS — M43.22 FUSION OF SPINE, CERVICAL REGION: Chronic | ICD-10-CM

## 2024-11-20 DIAGNOSIS — M54.2 CERVICALGIA: ICD-10-CM

## 2024-11-20 DIAGNOSIS — K63.89 OTHER SPECIFIED DISEASES OF INTESTINE: ICD-10-CM

## 2024-11-20 DIAGNOSIS — G47.33 OBSTRUCTIVE SLEEP APNEA (ADULT) (PEDIATRIC): ICD-10-CM

## 2024-11-20 DIAGNOSIS — E78.5 HYPERLIPIDEMIA, UNSPECIFIED: ICD-10-CM

## 2024-11-20 DIAGNOSIS — I10 ESSENTIAL (PRIMARY) HYPERTENSION: ICD-10-CM

## 2024-11-20 DIAGNOSIS — Z86.69 PERSONAL HISTORY OF OTHER DISEASES OF THE NERVOUS SYSTEM AND SENSE ORGANS: ICD-10-CM

## 2024-11-20 DIAGNOSIS — R10.9 UNSPECIFIED ABDOMINAL PAIN: ICD-10-CM

## 2024-11-20 DIAGNOSIS — Z98.890 OTHER SPECIFIED POSTPROCEDURAL STATES: Chronic | ICD-10-CM

## 2024-11-20 DIAGNOSIS — Q43.8 OTHER SPECIFIED CONGENITAL MALFORMATIONS OF INTESTINE: ICD-10-CM

## 2024-11-20 DIAGNOSIS — F41.9 ANXIETY DISORDER, UNSPECIFIED: ICD-10-CM

## 2024-11-20 DIAGNOSIS — M47.812 SPONDYLOSIS WITHOUT MYELOPATHY OR RADICULOPATHY, CERVICAL REGION: ICD-10-CM

## 2024-11-20 DIAGNOSIS — Z79.85 LONG-TERM (CURRENT) USE OF INJECTABLE NON-INSULIN ANTIDIABETIC DRUGS: ICD-10-CM

## 2024-11-20 DIAGNOSIS — Z98.1 ARTHRODESIS STATUS: Chronic | ICD-10-CM

## 2024-11-20 LAB
ALBUMIN SERPL ELPH-MCNC: 3.6 G/DL — SIGNIFICANT CHANGE UP (ref 3.3–5)
ALP SERPL-CCNC: 69 U/L — SIGNIFICANT CHANGE UP (ref 40–120)
ALT FLD-CCNC: 20 U/L — SIGNIFICANT CHANGE UP (ref 12–78)
ANION GAP SERPL CALC-SCNC: 3 MMOL/L — LOW (ref 5–17)
AST SERPL-CCNC: 13 U/L — LOW (ref 15–37)
BASOPHILS # BLD AUTO: 0.03 K/UL — SIGNIFICANT CHANGE UP (ref 0–0.2)
BASOPHILS NFR BLD AUTO: 0.3 % — SIGNIFICANT CHANGE UP (ref 0–2)
BILIRUB SERPL-MCNC: 0.6 MG/DL — SIGNIFICANT CHANGE UP (ref 0.2–1.2)
BUN SERPL-MCNC: 23 MG/DL — SIGNIFICANT CHANGE UP (ref 7–23)
CALCIUM SERPL-MCNC: 9.4 MG/DL — SIGNIFICANT CHANGE UP (ref 8.5–10.1)
CHLORIDE SERPL-SCNC: 102 MMOL/L — SIGNIFICANT CHANGE UP (ref 96–108)
CO2 SERPL-SCNC: 29 MMOL/L — SIGNIFICANT CHANGE UP (ref 22–31)
CREAT SERPL-MCNC: 0.68 MG/DL — SIGNIFICANT CHANGE UP (ref 0.5–1.3)
EGFR: 99 ML/MIN/1.73M2 — SIGNIFICANT CHANGE UP
EOSINOPHIL # BLD AUTO: 0.04 K/UL — SIGNIFICANT CHANGE UP (ref 0–0.5)
EOSINOPHIL NFR BLD AUTO: 0.4 % — SIGNIFICANT CHANGE UP (ref 0–6)
GLUCOSE SERPL-MCNC: 102 MG/DL — HIGH (ref 70–99)
HCT VFR BLD CALC: 41.7 % — SIGNIFICANT CHANGE UP (ref 39–50)
HGB BLD-MCNC: 14.1 G/DL — SIGNIFICANT CHANGE UP (ref 13–17)
IMM GRANULOCYTES NFR BLD AUTO: 0.3 % — SIGNIFICANT CHANGE UP (ref 0–0.9)
LIDOCAIN IGE QN: 29 U/L — SIGNIFICANT CHANGE UP (ref 13–75)
LYMPHOCYTES # BLD AUTO: 1.78 K/UL — SIGNIFICANT CHANGE UP (ref 1–3.3)
LYMPHOCYTES # BLD AUTO: 18.2 % — SIGNIFICANT CHANGE UP (ref 13–44)
MCHC RBC-ENTMCNC: 32.6 PG — SIGNIFICANT CHANGE UP (ref 27–34)
MCHC RBC-ENTMCNC: 33.8 G/DL — SIGNIFICANT CHANGE UP (ref 32–36)
MCV RBC AUTO: 96.3 FL — SIGNIFICANT CHANGE UP (ref 80–100)
MONOCYTES # BLD AUTO: 0.94 K/UL — HIGH (ref 0–0.9)
MONOCYTES NFR BLD AUTO: 9.6 % — SIGNIFICANT CHANGE UP (ref 2–14)
NEUTROPHILS # BLD AUTO: 6.94 K/UL — SIGNIFICANT CHANGE UP (ref 1.8–7.4)
NEUTROPHILS NFR BLD AUTO: 71.2 % — SIGNIFICANT CHANGE UP (ref 43–77)
PLATELET # BLD AUTO: 189 K/UL — SIGNIFICANT CHANGE UP (ref 150–400)
POTASSIUM SERPL-MCNC: 3.9 MMOL/L — SIGNIFICANT CHANGE UP (ref 3.5–5.3)
POTASSIUM SERPL-SCNC: 3.9 MMOL/L — SIGNIFICANT CHANGE UP (ref 3.5–5.3)
PROT SERPL-MCNC: 7 GM/DL — SIGNIFICANT CHANGE UP (ref 6–8.3)
RBC # BLD: 4.33 M/UL — SIGNIFICANT CHANGE UP (ref 4.2–5.8)
RBC # FLD: 12.6 % — SIGNIFICANT CHANGE UP (ref 10.3–14.5)
SODIUM SERPL-SCNC: 134 MMOL/L — LOW (ref 135–145)
WBC # BLD: 9.76 K/UL — SIGNIFICANT CHANGE UP (ref 3.8–10.5)
WBC # FLD AUTO: 9.76 K/UL — SIGNIFICANT CHANGE UP (ref 3.8–10.5)

## 2024-11-20 PROCEDURE — 80053 COMPREHEN METABOLIC PANEL: CPT

## 2024-11-20 PROCEDURE — 36000 PLACE NEEDLE IN VEIN: CPT | Mod: XU

## 2024-11-20 PROCEDURE — 85025 COMPLETE CBC W/AUTO DIFF WBC: CPT

## 2024-11-20 PROCEDURE — 36415 COLL VENOUS BLD VENIPUNCTURE: CPT

## 2024-11-20 PROCEDURE — 83690 ASSAY OF LIPASE: CPT

## 2024-11-20 PROCEDURE — 99285 EMERGENCY DEPT VISIT HI MDM: CPT | Mod: FS

## 2024-11-20 PROCEDURE — 74177 CT ABD & PELVIS W/CONTRAST: CPT | Mod: MC

## 2024-11-20 PROCEDURE — 99284 EMERGENCY DEPT VISIT MOD MDM: CPT | Mod: 25

## 2024-11-20 PROCEDURE — 74177 CT ABD & PELVIS W/CONTRAST: CPT | Mod: 26,MC

## 2024-11-20 RX ORDER — KETOROLAC TROMETHAMINE 30 MG/ML
15 INJECTION INTRAMUSCULAR; INTRAVENOUS ONCE
Refills: 0 | Status: DISCONTINUED | OUTPATIENT
Start: 2024-11-20 | End: 2024-11-20

## 2024-11-20 NOTE — ED STATDOCS - PROGRESS NOTE DETAILS
Labs and imaging reviewed.  No leukocytosis, sodium 134, no other actionable findings.  CT abdomen pelvis showing questionable epiploic appendagitis involving the sigmoid colon.  Discussed results with patient.  Pain controlled at this time.  Stable for discharge home with over-the-counter pain control, will refer to colorectal if pain persists.  Strict precautions to ED were discussed.  All questions and concerns were addressed. - Wendy Munguia PA-C

## 2024-11-20 NOTE — ED STATDOCS - OBJECTIVE STATEMENT
73 y/o male with a PMHx of anxiety, cervical spondylosis, DDD, glaucoma, HTN, HLD, macular pucker, KRISTINA, seasonal affective disorder presents to the ED c/o left sided abd pain starting this morning. Pt took Dilaudid without improvement. Pt recently restarted Ozempic. +constipation. No other complaints at this time. PCP: Dr. Sinha.

## 2024-11-20 NOTE — ED ADULT TRIAGE NOTE - CHIEF COMPLAINT QUOTE
Pt presents to the ED c/o LLQ pain x1 day. Denies n/v/d, fevers or dysuria. Pt took 4mg Dilaudid approximately 1 hour PTA with minimal relief. PMH of HTN, HLD, and back pain.

## 2024-11-20 NOTE — ED STATDOCS - CLINICAL SUMMARY MEDICAL DECISION MAKING FREE TEXT BOX
Pt with abd pain x1 day. Recently restarted Ozempic. Pt also taking Dilaudid PRN for neck pain. Pt also with constipation. Plan: labs, CT

## 2024-11-20 NOTE — ED STATDOCS - CPE ED CARDIAC NORM
med refill of lamictal given, he has psychiatry appointment tomorrow, no acute psychiatric issues.
normal...

## 2024-11-20 NOTE — ED ADULT NURSE NOTE - OBJECTIVE STATEMENT
Patient presents to the ER with complaints of left upper quadrant abdominal pain for a day associated with constipation for 3-4 days. Patient reports being on medication to lose weight for a while, Patient reports taking laxatives and eating fiber without any relief. Denies N/V, fever, shortness of breath or chest pain.

## 2024-11-20 NOTE — ED ADULT NURSE NOTE - NSFALLUNIVINTERV_ED_ALL_ED
Bed/Stretcher in lowest position, wheels locked, appropriate side rails in place/Call bell, personal items and telephone in reach/Instruct patient to call for assistance before getting out of bed/chair/stretcher/Non-slip footwear applied when patient is off stretcher/[place] to call system/Physically safe environment - no spills, clutter or unnecessary equipment/Purposeful proactive rounding/Room/bathroom lighting operational, light cord in reach

## 2024-11-20 NOTE — ED STATDOCS - CARE PROVIDER_API CALL
Seng Resendiz.  Colon/Rectal Surgery  [address] [facility] [address] [place] [address]  Phone: [phone]  Fax: [phone]  Follow Up Time:

## 2024-11-20 NOTE — ED STATDOCS - NSFOLLOWUPINSTRUCTIONS_ED_ALL_ED_FT
Follow up with your PCP.  Take Tylenol 650-1000 mg every 6 hours as needed for pain. Do not exceed 4,000 mg in a 24 hour period. Take Ibuprofen 600-800 mg every 6 hours as needed for moderate pain -- take with food.  Follow up with colorectal surgeon if pain does not improve.  Return to the ED sooner for new or worsening symptoms.    Epiploic Appendagitis    Epiploic appendagitis (EA) is swelling and irritation of pouches that are attached to the end of the large intestine or colon. These pouches are called epiploic appendages. These pouches contain fat and are attached to the outside of the colon. This condition causes sudden pain in the lower abdomen.    EA is rare, and it usually goes away on its own. It can feel like other abdominal conditions, such as appendicitis, gallbladder attack, or diverticulitis.    What are the causes?  This condition may be caused by:  Blocked blood flow due to a blood clot.  Twisting (torsion) of the epiploic appendages.  Conditions that cause swelling and inflammation of nearby tissue, such as long-term (chronic) diarrhea, Crohn's disease, or ulcerative colitis.  What increases the risk?  You are more likely to develop this condition if:  You are 40–50 years old.  You are male.  You are overweight.  You eat large meals.  What are the signs or symptoms?  The most common symptom of this condition is lower abdominal pain that starts suddenly and can be severe. Pain can be anywhere in the lower abdomen, but it is more common on the left side. The pain may get worse with movement or when pressing on your abdomen.    The following symptoms are possible, but they are not common:  Fever.  Nausea or vomiting.  Diarrhea or constipation.  How is this diagnosed?  Your health care provider may suspect EA if you have sudden lower abdominal pain without other symptoms. The condition may be diagnosed based on:  CT scan. This is the best way to diagnose EA.  Your symptoms.  A physical exam.  Ultrasound.  A blood test (complete blood count, CBC).  A procedure to check your abdomen using a scope that has a camera (laparoscopy). This may be done to confirm the diagnosis.  How is this treated?  EA usually goes away without treatment. Your health care provider may recommend NSAIDs to reduce pain and inflammation. NSAIDs include aspirin or ibuprofen. In rare cases, you may need surgery to remove the appendages. Surgery is needed if the condition does not improve with other treatments or if it keeps coming back.    Follow these instructions at home:  Take over-the-counter and prescription medicines only as told by your health care provider.  You may need to avoid strenuous exercise. Ask your health care provider what activities are safe for you.  Keep all follow-up visits. This is important.  How is this prevented?  Practice portion control. Eating large meals can put pressure on the intestines, which can lead to epiploic appendagitis.  Maintain a healthy weight.  Contact a health care provider if:  You have a fever or chills.  You develop nausea, vomiting, diarrhea, or constipation.  Your pain gets worse.  Your pain lasts longer than 10 days.  Get help right away if:  You have severe pain that does not get better after you take medicine.  Summary  Epiploic appendagitis (EA) is swelling and irritation of pouches (epiploic appendages) that are attached to the outside of the colon. The colon is the end portion of the large intestine.  EA can feel like other abdominal conditions, such as appendicitis, a gallbladder attack, or diverticulitis.  EA usually goes away without treatment. Your health care provider may recommend NSAIDs to reduce pain and inflammation.  This information is not intended to replace advice given to you by your health care provider. Make sure you discuss any questions you have with your health care provider.

## 2024-11-20 NOTE — ED STATDOCS - PATIENT PORTAL LINK FT
You can access the FollowMyHealth Patient Portal offered by  by registering at the following website: [email or web address]. By joining [employer]’s FollowMyHealth portal, you will also be able to view your health information using other applications (apps) compatible with our system.

## 2024-11-20 NOTE — ED STATDOCS - ATTENDING APP SHARED VISIT CONTRIBUTION OF CARE
I,Luis Alberto Aranda MD,  performed the initial face to face bedside interview with this patient regarding history of present illness, review of symptoms and relevant past medical, social and family history.  I completed an independent physical examination.  I was the initial provider who evaluated this patient. I have signed out the follow up of any pending tests (i.e. labs, radiological studies) to the ACP.  I have communicated the patient’s plan of care and disposition with the ACP.  The history, relevant review of systems, past medical and surgical history, medical decision making, and physical examination was documented by the scribe in my presence and I attest to the accuracy of the documentation.

## 2025-02-11 NOTE — ASU PATIENT PROFILE, ADULT - FALL HARM RISK - CONCLUSION
Casper Joy is here today for Office visit and Establish care  Concerns/symptoms: skin irritation, L leg & L shoulder issue  Medications: medications verified and updated  Refills needed today? Yes, medications pended     Tobacco history: verified  Advanced Directives:  NA  BP greater than 140/90? No    Patient would like communication of their results via:    Cell Phone:   Telephone Information:   Mobile [phone]     Okay to leave a message containing results? Yes  Preferred language:  English.       Health Maintenance       Depression Screening (Yearly)  Never done    Hepatitis B Vaccine (1 of 3 - 19+ 3-dose series)  Overdue since 7/17/1995    Pneumococcal Vaccine 0-49 (2 of 2 - PCV)  Overdue since 1/26/2022    Influenza Vaccine (1)  Overdue since 9/1/2024    COVID-19 Vaccine (1 - 2024-25 season)  Never done           Following review of the above:  Patient is not proceeding with: COVID-19, Hep B, Influenza, and Pneumococcal    Note: Refer to final orders and clinician documentation.           Medicare HRA:                PHQ 2:  PHQ 2 Score Adult PHQ 2 Score Adult PHQ 2 Interpretation Little interest or pleasure in activity?   4/26/2022   2:44 PM 0 No further screening needed 0       PHQ 9:      Universal Safety Interventions

## 2025-02-14 ENCOUNTER — APPOINTMENT (OUTPATIENT)
Dept: PAIN MANAGEMENT | Facility: CLINIC | Age: 75
End: 2025-02-14
Payer: MEDICARE

## 2025-02-14 DIAGNOSIS — M48.02 SPINAL STENOSIS, CERVICAL REGION: ICD-10-CM

## 2025-02-14 DIAGNOSIS — M54.12 RADICULOPATHY, CERVICAL REGION: ICD-10-CM

## 2025-02-14 PROCEDURE — 99212 OFFICE O/P EST SF 10 MIN: CPT | Mod: 2W

## 2025-07-01 ENCOUNTER — APPOINTMENT (OUTPATIENT)
Dept: PAIN MANAGEMENT | Facility: CLINIC | Age: 75
End: 2025-07-01
Payer: MEDICARE

## 2025-07-01 VITALS — BODY MASS INDEX: 29.55 KG/M2 | HEIGHT: 68 IN | WEIGHT: 195 LBS

## 2025-07-01 PROCEDURE — 99212 OFFICE O/P EST SF 10 MIN: CPT

## 2025-07-03 ENCOUNTER — APPOINTMENT (OUTPATIENT)
Facility: CLINIC | Age: 75
End: 2025-07-03
Payer: MEDICARE

## 2025-07-03 VITALS — BODY MASS INDEX: 29.55 KG/M2 | WEIGHT: 195 LBS | HEIGHT: 68 IN

## 2025-07-03 PROBLEM — M19.011 PRIMARY OSTEOARTHRITIS OF RIGHT SHOULDER: Status: ACTIVE | Noted: 2025-07-03

## 2025-07-03 PROCEDURE — 73030 X-RAY EXAM OF SHOULDER: CPT | Mod: RT

## 2025-07-03 PROCEDURE — 73010 X-RAY EXAM OF SHOULDER BLADE: CPT | Mod: RT

## 2025-07-03 PROCEDURE — 99213 OFFICE O/P EST LOW 20 MIN: CPT

## (undated) DEVICE — TUBING IV EXTENSION MACRO W CLAVE 7"

## (undated) DEVICE — SYR IV FLUSH SALINE 10ML 30/TY

## (undated) DEVICE — SOL INJ LR 500ML

## (undated) DEVICE — GLV 8.5 PROTEXIS (WHITE)

## (undated) DEVICE — CATH IV SAFE BC 22G X 1" (BLUE)

## (undated) DEVICE — TUBING ALARIS PUMP MODULE NON-DEHP

## (undated) DEVICE — PACK IV START WITH CHG

## (undated) DEVICE — NDL SPINAL 22G X 3.5" QUINCKE

## (undated) DEVICE — STYLET  ENDOTRACH 7.5MM X 10MM

## (undated) DEVICE — TRAY EPIDURAL SINGLE DOSE